# Patient Record
Sex: MALE | Race: OTHER | HISPANIC OR LATINO
[De-identification: names, ages, dates, MRNs, and addresses within clinical notes are randomized per-mention and may not be internally consistent; named-entity substitution may affect disease eponyms.]

---

## 2020-12-31 PROBLEM — Z00.00 ENCOUNTER FOR PREVENTIVE HEALTH EXAMINATION: Status: ACTIVE | Noted: 2020-12-31

## 2021-01-08 ENCOUNTER — APPOINTMENT (OUTPATIENT)
Dept: COLORECTAL SURGERY | Facility: CLINIC | Age: 56
End: 2021-01-08
Payer: MEDICAID

## 2021-01-08 VITALS
TEMPERATURE: 96.7 F | HEART RATE: 85 BPM | WEIGHT: 253 LBS | BODY MASS INDEX: 40.66 KG/M2 | HEIGHT: 66 IN | SYSTOLIC BLOOD PRESSURE: 154 MMHG | DIASTOLIC BLOOD PRESSURE: 90 MMHG

## 2021-01-08 DIAGNOSIS — E78.00 PURE HYPERCHOLESTEROLEMIA, UNSPECIFIED: ICD-10-CM

## 2021-01-08 DIAGNOSIS — Z83.3 FAMILY HISTORY OF DIABETES MELLITUS: ICD-10-CM

## 2021-01-08 DIAGNOSIS — I10 ESSENTIAL (PRIMARY) HYPERTENSION: ICD-10-CM

## 2021-01-08 DIAGNOSIS — E11.9 TYPE 2 DIABETES MELLITUS W/OUT COMPLICATIONS: ICD-10-CM

## 2021-01-08 DIAGNOSIS — Z87.828 PERSONAL HISTORY OF OTHER (HEALED) PHYSICAL INJURY AND TRAUMA: ICD-10-CM

## 2021-01-08 DIAGNOSIS — Z82.5 FAMILY HISTORY OF ASTHMA AND OTHER CHRONIC LOWER RESPIRATORY DISEASES: ICD-10-CM

## 2021-01-08 DIAGNOSIS — Z82.49 FAMILY HISTORY OF ISCHEMIC HEART DISEASE AND OTHER DISEASES OF THE CIRCULATORY SYSTEM: ICD-10-CM

## 2021-01-08 PROCEDURE — 99072 ADDL SUPL MATRL&STAF TM PHE: CPT

## 2021-01-08 PROCEDURE — 99204 OFFICE O/P NEW MOD 45 MIN: CPT | Mod: 25

## 2021-01-08 PROCEDURE — 45330 DIAGNOSTIC SIGMOIDOSCOPY: CPT

## 2021-01-08 NOTE — PHYSICAL EXAM
[No HSM] : no hepatosplenomegaly [Normal] : was normal [None] : there was no rectal mass  [Abdomen Masses] : No abdominal masses [Abdomen Tenderness] : ~T No ~M abdominal tenderness [FreeTextEntry1] : A rigid proctosigmoidoscope was passed through he anus into the rectum to 20  cm. . The mucosal surface were inspected. The patient tolerated the procedure well.\par \par The risks , benefits and alternatives of the procedure were reviewed with the patient. The patient consents to the planned procedure.\par \par The flexible sigmoidoscope was passed through the anus into the rectum. The scope was passed to  40   cm from the anal verge.\par \par The findings revealed:\par On retroflexion at 2 cm from the dentate line area of 1.5 x 1.5 CM post polypectomy ulceration. No residual polyp\par \par

## 2021-01-08 NOTE — ASSESSMENT
[FreeTextEntry1] : I reviewed with the patient and his daughter with a  the findings on examination workup will require further assessment for staging of his distal rectal cancer. We will obtain the slides for review. Pending review of the studies further treatment recommendations will be forthcoming. However, I would anticipate if there is no evidence of metastatic disease a transanal excision would be most appropriate of the polypectomy site. The risks, benefits and alternatives of the treatment plan have been outlined including recurrent cancer, metastatic disease, and need for future surgery.

## 2021-01-08 NOTE — HISTORY OF PRESENT ILLNESS
[FreeTextEntry1] : 54 y/o M presents for evaluation of newly diagnosed colon cancer\par C/o intermittent abdominal pain and bloating that improves with defecation. Has been experiencing this pain intermittently for the past 2 years\par Denies rectal bleeding, rectal pain, change in appetite or weight\par BH: daily to every other day\par Admits to constipation and straining\par Admits to limited dietary fiber intake. Drinking 6 +cups of water daily\par Denies FMH of GI disorder or GI cancers \par No ASA/NSAIDs last 7 days \par \par Colonoscopy completed 12/28/20\par - 7 mm polyp found in the proximal sigmoid colon, semi-pedunculated. Resected and retrieved\par - 3 mm polyp found in the rect-sigmoid colon, sessile. Resected and retrieved\par - 15 mm polyp in the distal rectum, resected and retrieved\par - non-bleeding internal hemorrhoids\par \par Pathology:\par A.) sigmoid: TA\par B.) Rectosigmoid: Hyperplastic polyp\par C.) Invasive, well-differentiated colorectal adenocarcinom arising in a TVA\par \par Previous colonoscopy completed 10/15/20\par - 7 mm polyp found in the sigmoid colon, pedunculated. Resected and retrieved \par - 10 mm polyp found in the descending colon, pedunculated. Resected and retrieved\par - internal hemorrhoids\par - large amount of stool in rest of colon interfering with visualization\par \par Pathology\par A & B: TA \par \par

## 2021-01-11 ENCOUNTER — NON-APPOINTMENT (OUTPATIENT)
Age: 56
End: 2021-01-11

## 2021-01-11 LAB
ALBUMIN SERPL ELPH-MCNC: 4.4 G/DL
ALP BLD-CCNC: 120 U/L
ALT SERPL-CCNC: 60 U/L
ANION GAP SERPL CALC-SCNC: 18 MMOL/L
AST SERPL-CCNC: 57 U/L
BASOPHILS # BLD AUTO: 0.04 K/UL
BASOPHILS NFR BLD AUTO: 0.6 %
BILIRUB SERPL-MCNC: 0.4 MG/DL
BUN SERPL-MCNC: 9 MG/DL
CALCIUM SERPL-MCNC: 9.5 MG/DL
CEA SERPL-MCNC: 2.3 NG/ML
CHLORIDE SERPL-SCNC: 102 MMOL/L
CO2 SERPL-SCNC: 18 MMOL/L
CREAT SERPL-MCNC: 0.64 MG/DL
EOSINOPHIL # BLD AUTO: 0.14 K/UL
EOSINOPHIL NFR BLD AUTO: 1.9 %
GLUCOSE SERPL-MCNC: 149 MG/DL
HCT VFR BLD CALC: 50.5 %
HGB BLD-MCNC: 16.8 G/DL
IMM GRANULOCYTES NFR BLD AUTO: 0.4 %
LYMPHOCYTES # BLD AUTO: 2.55 K/UL
LYMPHOCYTES NFR BLD AUTO: 35.5 %
MAN DIFF?: NORMAL
MCHC RBC-ENTMCNC: 30.1 PG
MCHC RBC-ENTMCNC: 33.3 GM/DL
MCV RBC AUTO: 90.5 FL
MONOCYTES # BLD AUTO: 0.6 K/UL
MONOCYTES NFR BLD AUTO: 8.3 %
NEUTROPHILS # BLD AUTO: 3.83 K/UL
NEUTROPHILS NFR BLD AUTO: 53.3 %
PLATELET # BLD AUTO: 167 K/UL
POTASSIUM SERPL-SCNC: 4.3 MMOL/L
PROT SERPL-MCNC: 7.7 G/DL
RBC # BLD: 5.58 M/UL
RBC # FLD: 12.8 %
SODIUM SERPL-SCNC: 138 MMOL/L
WBC # FLD AUTO: 7.19 K/UL

## 2021-01-12 ENCOUNTER — TRANSCRIPTION ENCOUNTER (OUTPATIENT)
Age: 56
End: 2021-01-12

## 2021-01-22 ENCOUNTER — RESULT REVIEW (OUTPATIENT)
Age: 56
End: 2021-01-22

## 2021-01-22 ENCOUNTER — APPOINTMENT (OUTPATIENT)
Dept: CT IMAGING | Facility: CLINIC | Age: 56
End: 2021-01-22
Payer: MEDICAID

## 2021-01-22 ENCOUNTER — APPOINTMENT (OUTPATIENT)
Dept: MRI IMAGING | Facility: CLINIC | Age: 56
End: 2021-01-22
Payer: MEDICAID

## 2021-01-22 ENCOUNTER — OUTPATIENT (OUTPATIENT)
Dept: OUTPATIENT SERVICES | Facility: HOSPITAL | Age: 56
LOS: 1 days | End: 2021-01-22

## 2021-01-22 PROCEDURE — 71260 CT THORAX DX C+: CPT | Mod: 26

## 2021-01-22 PROCEDURE — 72197 MRI PELVIS W/O & W/DYE: CPT | Mod: 26

## 2021-01-25 ENCOUNTER — RESULT REVIEW (OUTPATIENT)
Age: 56
End: 2021-01-25

## 2021-01-25 PROCEDURE — 74177 CT ABD & PELVIS W/CONTRAST: CPT | Mod: 26

## 2021-01-26 ENCOUNTER — NON-APPOINTMENT (OUTPATIENT)
Age: 56
End: 2021-01-26

## 2021-02-04 ENCOUNTER — RESULT REVIEW (OUTPATIENT)
Age: 56
End: 2021-02-04

## 2021-02-04 ENCOUNTER — OUTPATIENT (OUTPATIENT)
Dept: OUTPATIENT SERVICES | Facility: HOSPITAL | Age: 56
LOS: 1 days | End: 2021-02-04
Payer: MEDICAID

## 2021-02-04 ENCOUNTER — APPOINTMENT (OUTPATIENT)
Dept: COLORECTAL SURGERY | Facility: CLINIC | Age: 56
End: 2021-02-04
Payer: MEDICAID

## 2021-02-04 VITALS
DIASTOLIC BLOOD PRESSURE: 88 MMHG | BODY MASS INDEX: 40.34 KG/M2 | WEIGHT: 251 LBS | TEMPERATURE: 95.7 F | HEIGHT: 66 IN | SYSTOLIC BLOOD PRESSURE: 156 MMHG | HEART RATE: 83 BPM

## 2021-02-04 DIAGNOSIS — C20 MALIGNANT NEOPLASM OF RECTUM: ICD-10-CM

## 2021-02-04 PROCEDURE — 88321 CONSLTJ&REPRT SLD PREP ELSWR: CPT

## 2021-02-04 PROCEDURE — 99072 ADDL SUPL MATRL&STAF TM PHE: CPT

## 2021-02-04 PROCEDURE — 99215 OFFICE O/P EST HI 40 MIN: CPT

## 2021-02-04 RX ORDER — ATORVASTATIN CALCIUM 80 MG/1
80 TABLET, FILM COATED ORAL
Refills: 0 | Status: ACTIVE | COMMUNITY

## 2021-02-04 RX ORDER — GLIMEPIRIDE 4 MG/1
4 TABLET ORAL
Refills: 0 | Status: ACTIVE | COMMUNITY

## 2021-02-04 RX ORDER — AMLODIPINE BESYLATE 5 MG/1
5 TABLET ORAL
Refills: 0 | Status: ACTIVE | COMMUNITY

## 2021-02-04 NOTE — PHYSICAL EXAM
[Abdomen Masses] : No abdominal masses [Abdomen Tenderness] : ~T No ~M abdominal tenderness [No HSM] : no hepatosplenomegaly [Normal] : was normal [None] : there was no rectal mass

## 2021-02-04 NOTE — ASSESSMENT
[FreeTextEntry1] : I reviewed with the patient and his daughter/translating Lithuanian-the findings on examination and workup are consistent with a malignant rectal polyp. At this time there is no definitive evidence of residual disease endoscopically despite findings of abnormality noted on MRI. I suspect that the MRI may potentially be an over read secondary to the polypectomy tissue effects.\par I have had a thorough and detailed discussion with the patient regarding treatment options. The risks, benefits alternatives and treatments have been detailed. At this time his case will be presented at the [facility] [employer] by tumor conference. However, I would recommend that we proceed with a transanal excision of the polypectomy site to confirm residual cancer and stage of disease/T. stage. There is no MRI or radiographic evidence of jeronimo or metastatic disease. If transanal excision reveals T1 or no residual disease we will continue close surveillance observation. As long as there is no adverse risk pathological factors. However if residual cancer is noted to be T2 on final pathology after transanal excision, I would suspect he would be a candidate for potential abdominal perineal resection with permanent colostomy versus radiation chemotherapy and close surveillance. The patient is apprehensive to consider possibly of a permanent colostomy and wishes to proceed with local excision. I have outlined the role of continue close surveillance and followup.

## 2021-02-04 NOTE — HISTORY OF PRESENT ILLNESS
[FreeTextEntry1] : 56 y/o English and English speaking M presents for f/u T1-2 colon cancer\par \par Colonoscopy completed 12/28/20\par - 7 mm polyp found in the proximal sigmoid colon, semi-pedunculated. Resected and retrieved\par - 3 mm polyp found in the rect-sigmoid colon, sessile. Resected and retrieved\par - 15 mm polyp in the distal rectum, resected and retrieved\par - non-bleeding internal hemorrhoids\par \par Pathology:\par A.) sigmoid: TA\par B.) Rectosigmoid: Hyperplastic polyp\par C.) Invasive, well-differentiated colorectal adenocarcinoma arising in a TVA\par \par CT C/A/P completed 1/26/21\par - solitary nodule within the left lobe of the prostate gland, PSA correlation is recommended \par - negative for metastatic disease\par \par MRI pelvis completed 1/26/21 \par - 3.4 cm ulcerated mid rectal mass at 6.7 cm\par - T1-2, N0\par \par C/o intermittent pelvic pain with no aggravating of alleviating factors\par Denies rectal bleeding, rectal pain, change in appetite or weight\par BH: daily \par Admits to constipation and straining\par Admits to limited dietary fiber intake. Drinking 6 +cups of water daily\par Denies FMH of GI disorder or GI cancers \par No ASA/NSAIDs last 7 days

## 2021-02-08 ENCOUNTER — NON-APPOINTMENT (OUTPATIENT)
Age: 56
End: 2021-02-08

## 2021-02-08 LAB
PSA FREE FLD-MCNC: 24 %
PSA FREE SERPL-MCNC: 0.25 NG/ML
PSA SERPL-MCNC: 1.03 NG/ML
SURGICAL PATHOLOGY STUDY: SIGNIFICANT CHANGE UP

## 2021-02-10 ENCOUNTER — APPOINTMENT (OUTPATIENT)
Dept: UROLOGY | Facility: CLINIC | Age: 56
End: 2021-02-10
Payer: MEDICAID

## 2021-02-10 VITALS — HEART RATE: 81 BPM | SYSTOLIC BLOOD PRESSURE: 145 MMHG | DIASTOLIC BLOOD PRESSURE: 81 MMHG | TEMPERATURE: 97.8 F

## 2021-02-10 PROCEDURE — 99072 ADDL SUPL MATRL&STAF TM PHE: CPT

## 2021-02-10 PROCEDURE — 99204 OFFICE O/P NEW MOD 45 MIN: CPT

## 2021-02-10 NOTE — PHYSICAL EXAM
[General Appearance - Well Developed] : well developed [General Appearance - Well Nourished] : well nourished [Normal Appearance] : normal appearance [Well Groomed] : well groomed [General Appearance - In No Acute Distress] : no acute distress [Edema] : no peripheral edema [Respiration, Rhythm And Depth] : normal respiratory rhythm and effort [Abdomen Soft] : soft [Exaggerated Use Of Accessory Muscles For Inspiration] : no accessory muscle use [Abdomen Tenderness] : non-tender [Costovertebral Angle Tenderness] : no ~M costovertebral angle tenderness [Penis Abnormality] : normal uncircumcised penis [Urethral Meatus] : meatus normal [Urinary Bladder Findings] : the bladder was normal on palpation [Scrotum] : the scrotum was normal [Rectal Exam - Seminal Vesicles] : the seminal vesicles were normal [Testes Tenderness] : no tenderness of the testes [Testes Mass (___cm)] : there were no testicular masses [Normal Station and Gait] : the gait and station were normal for the patient's age [] : no rash [No Focal Deficits] : no focal deficits [Affect] : the affect was normal [Oriented To Time, Place, And Person] : oriented to person, place, and time [Mood] : the mood was normal [Not Anxious] : not anxious [No Palpable Adenopathy] : no palpable adenopathy [Prostate Tenderness] : the prostate was not tender [FreeTextEntry1] : Difficult CINTHYA due to body habitisus but slight irregularity noted on left side of prostate

## 2021-02-10 NOTE — HISTORY OF PRESENT ILLNESS
[Nocturia] : nocturia [None] : None [FreeTextEntry1] :   ID # [id number]\par This is a 55 year old male who presents today as referral from Dr. Rowland after CT finding of solitary nodule within left lobe of the prostate gland.  He is in the process of being treated for rectal adenocarcinoma. \par Recent PSA 1.03 done on 2/4/21\par \par He reports Nocturia x 4-5 for the past 6 months, which he state prior was 0\par No hematuria, no dysuria\par Occasional lower back pain, can be severe at time, resolves without intervention.\par \par He is currently scheduled to undergo transanal excision of the polypectomy on 2/25/21\par \par Social Hx: nonsmoker, rare ETOH use, works in a restaurant\par Family Hx: no CaP, unusure of any other cancer within family\par \par  [Urinary Incontinence] : no urinary incontinence [Urinary Retention] : no urinary retention [Urinary Frequency] : no urinary frequency [Urinary Urgency] : no urinary urgency [Straining] : no straining [Weak Stream] : no weak stream [Intermittency] : no intermittency [Post-Void Dribbling] : no post-void dribbling [Dysuria] : no dysuria [Hematuria - Gross] : no gross hematuria [Flank Pain] : no flank pain [Weight Loss] : no recent ~M [unfilled] weight loss [Fever] : no fever [Fatigue] : no fatigue [Nausea] : no nausea [Anorexia] : no anorexia

## 2021-02-10 NOTE — ASSESSMENT
[FreeTextEntry1] : 55 year old Kiswahili male with nodule on prostate found after work up for colon cancer\par -abnormal CINTHYA but difficult to fully assess due to body habitus\par -reviewed findings of CT with patient \par -PVR today was 25 cc\par -will start Tamsulosin for nocturia, goals along with side effect discussed with patient\par -MRI prostate to further evaluate nodule seen on CT  \par \par RTC after MRI

## 2021-02-10 NOTE — END OF VISIT
[FreeTextEntry3] : 56yo male with rectal cancer, found to have prostate nodule on CT imaging. PSA is normal. MRI 1.5T of the pelvis is non-diagnostic for prostate cancer. Reviewed imaging and reports. Recommend dedicated MRI 3 Kailey of the prostate. Will try to obtain urgently before planned resection of rectal cancer.

## 2021-02-22 ENCOUNTER — EMERGENCY (EMERGENCY)
Facility: HOSPITAL | Age: 56
LOS: 1 days | Discharge: HOME | End: 2021-02-22
Attending: EMERGENCY MEDICINE | Admitting: EMERGENCY MEDICINE
Payer: MEDICAID

## 2021-02-22 VITALS
HEART RATE: 89 BPM | OXYGEN SATURATION: 97 % | RESPIRATION RATE: 18 BRPM | SYSTOLIC BLOOD PRESSURE: 160 MMHG | DIASTOLIC BLOOD PRESSURE: 88 MMHG | TEMPERATURE: 98 F | WEIGHT: 253.09 LBS

## 2021-02-22 DIAGNOSIS — M79.644 PAIN IN RIGHT FINGER(S): ICD-10-CM

## 2021-02-22 DIAGNOSIS — Z20.822 CONTACT WITH AND (SUSPECTED) EXPOSURE TO COVID-19: ICD-10-CM

## 2021-02-22 LAB — SARS-COV-2 RNA SPEC QL NAA+PROBE: SIGNIFICANT CHANGE UP

## 2021-02-22 PROCEDURE — 99283 EMERGENCY DEPT VISIT LOW MDM: CPT

## 2021-02-22 PROCEDURE — U0003: CPT

## 2021-02-22 PROCEDURE — 73130 X-RAY EXAM OF HAND: CPT

## 2021-02-22 PROCEDURE — 73130 X-RAY EXAM OF HAND: CPT | Mod: 26,RT

## 2021-02-22 PROCEDURE — U0005: CPT

## 2021-02-22 NOTE — ED PROVIDER NOTE - NSFOLLOWUPINSTRUCTIONS_ED_ALL_ED_FT
FINGER SPRAIN - AfterCare(R) Instructions(ER/ED)           Finger Sprain    WHAT YOU NEED TO KNOW:    A finger sprain happens when ligaments in your finger or thumb are stretched or torn. Ligaments are the tough tissues that connect bones. Ligaments allow your hands to grasp and pinch.    DISCHARGE INSTRUCTIONS:    Return to the emergency department if:   •The skin on your injured finger looks bluish or pale (less color than normal).      •You have new weakness or numbness in your finger or thumb. It may tingle or burn.       •You have a splint that you cannot adjust and it feels too tight.      Contact your healthcare provider if:   •You have new or increased swelling or pain in your finger.      •You have new or increased stiffness when you move your injured finger.      •You have questions or concerns about your injury or treatment.      Medicines:   •Pain medicine may be given. Do not wait until the pain is severe before taking your medicine.      •Take your medicine as directed. Call your healthcare provider if you think your medicines are not helping or if you have side effects. Tell him if you take vitamins, herbs, or any other medicines. Keep a written list of your medicines. Include the amounts, and when and why you take them. Bring the list or the pill bottles to follow-up visits.       Care for your finger:   •Rest your finger for at least 48 hours. Do not do activities that cause pain. Return to normal activities as directed.      •Apply ice on your finger to help decrease pain and swelling. Put crushed ice in a plastic bag and cover it with a towel. Put the ice on your injured finger or thumb every hour for 15 to 20 minutes at a time. You may need to ice the area at least 4 to 8 times each day. Ice your finger for as many days as directed.      •Elevate your finger above the level of your heart as often as you can. This will help decrease swelling and pain. You can elevate your hand by resting it on a pillow.       •Use a splint or compression as directed. Compression (tight hold) helps support your finger or thumb as it heals. Tape your injured finger to the finger beside it. Severe sprains may be treated with a splint. A splint prevents your finger from moving while it heals. Ask how long you must wear the splint or tape, and how to apply them.       •Do exercises as directed. You may be given gentle exercises to begin in a few days. Exercises can help decrease stiffness in your finger or thumb. Exercises also help decrease pain and swelling and improve the movement of your finger or thumb. Check with your healthcare provider before you return to your normal activities or sports.       Follow up with your healthcare provider as directed: Write down any questions you may have to ask at your follow up visits.        © Copyright [employer] 2021           back to top                          © Copyright [employer] 2021

## 2021-02-22 NOTE — ED PROVIDER NOTE - PATIENT PORTAL LINK FT
You can access the FollowMyHealth Patient Portal offered by [facility] by registering at the following website: [email or web address]. By joining [employer]’s FollowMyHealth portal, you will also be able to view your health information using other applications (apps) compatible with our system.

## 2021-02-22 NOTE — ED ADULT TRIAGE NOTE - CHIEF COMPLAINT QUOTE
c/o right hand pain.  Patient slipped and fell down 3 steps last week and landed on his right hand.  Denies loc, use of thinners, neck / back pain, head injury

## 2021-02-22 NOTE — ED PROVIDER NOTE - OBJECTIVE STATEMENT
55 M co R 5th finger pain- s/p fall about 7-8 days ago  sharp R finger pain- weakness w finger movements  no other injuries  px is borderline BM  no other sig pmh

## 2021-02-22 NOTE — ED PROVIDER NOTE - CLINICAL SUMMARY MEDICAL DECISION MAKING FREE TEXT BOX
Finger weakness/pain s/p fall- no obvious fx on xray- d/c home in splint- fu w hand surgeon urgently

## 2021-02-22 NOTE — ED ADULT NURSE NOTE - OBJECTIVE STATEMENT
Patient A&Ox4, respirations even and unlabored, right palm abrasion observed, swelling to right 5th digits, otherwise no deformities, patient able to move hand. Patient reports mechanical fall x 2 week ago Denies head injuries.

## 2021-02-24 ENCOUNTER — TRANSCRIPTION ENCOUNTER (OUTPATIENT)
Age: 56
End: 2021-02-24

## 2021-02-24 VITALS
TEMPERATURE: 99 F | HEIGHT: 66 IN | WEIGHT: 250 LBS | DIASTOLIC BLOOD PRESSURE: 79 MMHG | HEART RATE: 87 BPM | SYSTOLIC BLOOD PRESSURE: 131 MMHG | RESPIRATION RATE: 16 BRPM | OXYGEN SATURATION: 95 %

## 2021-02-24 NOTE — ASU PREOP CHECKLIST - SELECT TESTS ORDERED
Covid neg 2/22/21/BMP/CBC/CMP/PT/PTT/INR/EKG/COVID Covid neg 2/22/21/BMP/CBC/CMP/PT/PTT/INR/Type and Screen/EKG/COVID

## 2021-02-24 NOTE — ASU PREOP CHECKLIST - DENTURES
[facility]    Progress Note    Pavan Luciano Patient Status:  Inpatient    1963 MRN [id number]   [facility] 4SW-A Attending Loc Salazar MD   [facility] Day # 15 PCP Johnathon Mina MD     Subjective:  Pavan Luciano is a(n) 41 mg in dextrose 5 % 100 mL IVPB 250 mg Intravenous Q12H   Midazolam HCl (VERSED) 2 MG/2ML injection 2 mg 2 mg Intravenous Q5 Min PRN   Normal Saline Flush 0.9 % injection 10 mL 10 mL Intravenous PRN   peppermint oil liquid 10 mL 10 mL Other PRN   hydrochlor WBC 18.0 05/03/2018   HGB 9.1 05/03/2018   HCT 30.0 05/03/2018   .0 05/03/2018   CREATSERUM 2.03 05/03/2018   CREATSERUM 2.03 05/03/2018   BUN 52 05/03/2018    05/03/2018   K 3.5 05/03/2018    05/03/2018   CO2 21.0 05/03/2018    fever, afebrile for 24 hours  CSF evaluation for infection has been brought up, however he has continued to become more alert without any treatment for viral or atypical meningitis/encephalitis, which argues against having these, will continue to monitor f no

## 2021-02-24 NOTE — ASU PATIENT PROFILE, ADULT - NS PRO ABUSE SCREEN AFRAID ANYONE YN
History of Present Illness





- Reason for Consult


Consult date: 11/24/19


Neck wound SIRS and UTI


Requesting physician: Kylie Ovalle





- Chief Complaint


Fever and mental status changes x one day





- History of Present Illness


Patient is a 68-year-old  male with a past medical history significant 

for Ms. his carcinoma to the left side of the neck reCurrent with secondary 

metastasis to the lung and the local lymph node, for the patient has recently 

completed a series of palliative radiation therapy to the neck Site, patient was

brought into the ER at [facility] on 11/22/2019 by the wife who has 

been concerned the patient was confused and not making any sense , the patient 

did not recall remembering anything on the day of presentation the hospital or 

denies having any headache currently the patient denies having any worsening 

pain to the neck with the patient currently did have 2 wounds he did have mild 

aching pain intensity about 3-4 out of 10 and no radiation however the wife 

mentioned more redness to the neck site the patient denies having any chest pain

or shortness breath very minimal cough but no sputum production he did have an 

episode of vomiting but no further vomiting has been noticing denies any bowel. 

No diarrhea no significant burning frequency or difficulty urination with the 

symptoms and the patient was initially evaluated by the physician on route via 

the patient did have fever of 102 revealed Fahrenheit patient did have mild 

tachycardia and elevated white count patient also have a mildly positive UA 

glucose wound culture has been obtained is currently pending patient has been 

treated with vancomycin and cefepime infectious disease was consulted for 

further recommendation regarding antibiotic therapy








Review of Systems


Positive point has been  mentioned in the HPI rest of the systems are negative








Past Medical History


Past Medical History: Cancer, Hypertension


Additional Past Medical History / Comment(s): HX SKIN CA, CURRENT LESION ON LEFT

CHEEK, lung CA, CA in the lymph nodes. 10 treatments of radiation over 3 week 

period, last treatment 11/20/19.


History of Any Multi-Drug Resistant Organisms: None Reported


Past Surgical History: Back Surgery, Heart Catheterization, Joint Replacement, 

Orthopedic Surgery


Additional Past Surgical History / Comment(s): PREVIOUS SKIN CA LESIONS REMOVED,

R HIP REPLACEMENT, ANKLE ORIF WITH METAL L, lung biopsy, neck biopsy


Past Anesthesia/Blood Transfusion Reactions: Previous Problems w/ Anesthesia


Additional Past Anesthesia/Blood Transfusion Reaction / Comm: Pt woke up 

violently, which is out of characteristic for the pt


Past Psychological History: No Psychological Hx Reported


Smoking Status: Never smoker


Past Alcohol Use History: Occasional


Additional Past Alcohol Use History / Comment(s): Patient is a lifelong nonsmoke

r but had extensive exposure to asbestos and acid at [employer] as well as 

plastic exposure and [employer].


Past Drug Use History: None Reported





- Past Family History


  ** Mother


Family Medical History: Cancer, Deep Vein Thrombosis (DVT)


Additional Family Medical History / Comment(s): BREAST CA





Medications and Allergies


                                Home Medications











 Medication  Instructions  Recorded  Confirmed  Type


 


Acetaminophen [Tylenol Arthritis] 650 mg PO BID PRN 10/31/19 11/22/19 History


 


Melatonin 10 mg PO HS #30 tablet.er 11/03/19 11/22/19 Rx


 


hydrALAZINE HCL [Apresoline] 25 mg PO TID #90 tab 11/03/19 11/22/19 Rx


 


Metoprolol Tartrate [Lopressor] 12.5 mg PO HS 11/22/19 11/22/19 History








                                    Allergies











Allergy/AdvReac Type Severity Reaction Status Date / Time


 


No Known Allergies Allergy   Verified 11/22/19 16:10














Physical Exam


Vitals: 


                                   Vital Signs











  Temp Pulse Resp BP Pulse Ox


 


 11/24/19 11:07  100 F H  93  18  174/93  97


 


 11/24/19 08:00  99.4 F  97  18  170/93  97


 


 11/24/19 03:00  99.1 F  85  16  161/90  97


 


 11/24/19 00:10  99.5 F  88  16  142/76  95


 


 11/23/19 20:45  99.6 F  92  18  146/84  97


 


 11/23/19 19:20      96


 


 11/23/19 16:00    18  


 


 11/23/19 15:17  100.1 F H  104 H  18  140/81  98








                                Intake and Output











 11/23/19 11/24/19 11/24/19





 22:59 06:59 14:59


 


Intake Total   480


 


Output Total 450 900 580


 


Balance -450 -900 -100


 


Intake:   


 


  Oral   480


 


Output:   


 


  Urine 450 900 580


 


Other:   


 


  Voiding Method Urinal Urinal Urinal


 


  # Voids 1 1 


 


  Weight  80.8 kg 











GENERAL DESCRIPTION: An elderly male lying in bed, no distress. No tachypnea or 

accessory muscle of respiration use.


HEENT: Shows Pallor , no scleral icterus. Oral mucous membrane is dry. No 

pharyngeal erythema or thrush


NECK: Trachea central, no thyromegaly.  Left side of the neck tissues 2 wounds 

posteriorly wound did have some slough tissue present area of induration and 

redness which is warm to touch no foul-smelling drainage


LUNGS: Unlabored breathing. Clear to auscultation anteriorly. No wheeze or 

crackle.


HEART: S1, S2, regular rate and rhythm. No loud murmur


ABDOMEN: Soft, no tenderness , guarding or rigidity, no organomegaly


EXTREMITIES: No edema of feet.


SKIN: No rash, no masses palpable.


NEUROLOGICAL: The patient is awake, alert, oriented x3, mood and affect normal.

















Results


CBC & Chem 7: 


                                 11/24/19 06:55





                                 11/24/19 06:55


Labs: 


                  Abnormal Lab Results - Last 24 Hours (Table)











  11/24/19 11/24/19 Range/Units





  06:55 06:55 


 


RBC   4.07 L  (4.30-5.90)  m/uL


 


Hgb   9.4 L  (13.0-17.5)  gm/dL


 


Hct   30.4 L  (39.0-53.0)  %


 


MCV   74.7 L  (80.0-100.0)  fL


 


MCH   23.2 L  (25.0-35.0)  pg


 


RDW   16.6 H  (11.5-15.5)  %


 


Lymphocytes #   0.3 L  (1.0-4.8)  k/uL


 


Sodium  135 L   (137-145)  mmol/L


 


Potassium  3.1 L   (3.5-5.1)  mmol/L


 


BUN  8 L   (9-20)  mg/dL


 


Creatinine  0.62 L   (0.66-1.25)  mg/dL


 


Glucose  103 H   (74-99)  mg/dL


 


Calcium  8.0 L   (8.4-10.2)  mg/dL








                      Microbiology - Last 24 Hours (Table)











 11/23/19 17:00 Gram Stain - Preliminary





 Neck Wound Culture - Preliminary


 


 11/23/19 17:00 Anaerobic Culture - Preliminary





 Neck 


 


 11/22/19 16:10 Blood Culture - Preliminary





 Blood    No Growth after 24 hours


 


 11/22/19 15:10 Urine Culture - Final





 Urine,Voided 














Assessment and Plan


Assessment: 





1-patient presented to hospital with sepsis in this patient who did a fever and 

elevated white count in this patient currently undergoing radiation therapy to 

the left side of the neck basal cell carcinoma currently with 2 wounds patient 

noticed to have more redness to the neck with underlying induration consented 

likely for cellulitis and will need to make sure no evidence of underlying 

abscess


2-patient did have a positive UA however patient did not mention significant 

urinary symptoms to be concerned for symptomatic urinary tract infection


(1) Sepsis


Current Visit: Yes   Status: Acute   Code(s): A41.9 - SEPSIS, UNSPECIFIED 

ORGANISM   SNOMED Code(s): 53024646


   


Plan: 





1-we will obtain a CT of the neck to make sure that evidence of any underlying 

abscess that may need to be drained


2-Vancomycin pharmacy to dose target trough of 15 while watching his kidney 

function and Vanco trough closely


3-cefepime 2 g every 12 hours


4-local wound care with jerrelloney followed by the moist dressing to be changed 

daily


We will follow on clinical condition and cultures to further adjust medication 

if needed


Thank you for this consultation will follow this patient with you





Time with Patient: Greater than 30
no

## 2021-02-25 ENCOUNTER — OUTPATIENT (OUTPATIENT)
Dept: INPATIENT UNIT | Facility: HOSPITAL | Age: 56
LOS: 1 days | Discharge: HOME | End: 2021-02-25
Payer: MEDICAID

## 2021-02-25 ENCOUNTER — APPOINTMENT (OUTPATIENT)
Dept: COLORECTAL SURGERY | Facility: HOSPITAL | Age: 56
End: 2021-02-25

## 2021-02-25 VITALS
TEMPERATURE: 97 F | HEART RATE: 86 BPM | RESPIRATION RATE: 16 BRPM | DIASTOLIC BLOOD PRESSURE: 78 MMHG | SYSTOLIC BLOOD PRESSURE: 128 MMHG | OXYGEN SATURATION: 96 %

## 2021-02-25 DIAGNOSIS — Z98.890 OTHER SPECIFIED POSTPROCEDURAL STATES: Chronic | ICD-10-CM

## 2021-02-25 LAB
BLD GP AB SCN SERPL QL: NEGATIVE — SIGNIFICANT CHANGE UP
RH IG SCN BLD-IMP: POSITIVE — SIGNIFICANT CHANGE UP

## 2021-02-25 PROCEDURE — 45172 EXC RECT TUM TRANSANAL FULL: CPT

## 2021-02-25 PROCEDURE — 86850 RBC ANTIBODY SCREEN: CPT

## 2021-02-25 PROCEDURE — 45330 DIAGNOSTIC SIGMOIDOSCOPY: CPT | Mod: GC

## 2021-02-25 PROCEDURE — 86901 BLOOD TYPING SEROLOGIC RH(D): CPT

## 2021-02-25 PROCEDURE — 88305 TISSUE EXAM BY PATHOLOGIST: CPT | Mod: 26

## 2021-02-25 PROCEDURE — 45172 EXC RECT TUM TRANSANAL FULL: CPT | Mod: GC

## 2021-02-25 PROCEDURE — 82962 GLUCOSE BLOOD TEST: CPT

## 2021-02-25 PROCEDURE — 88305 TISSUE EXAM BY PATHOLOGIST: CPT

## 2021-02-25 PROCEDURE — 86900 BLOOD TYPING SEROLOGIC ABO: CPT

## 2021-02-25 RX ORDER — HYDROMORPHONE HYDROCHLORIDE 2 MG/ML
0.5 INJECTION INTRAMUSCULAR; INTRAVENOUS; SUBCUTANEOUS ONCE
Refills: 0 | Status: DISCONTINUED | OUTPATIENT
Start: 2021-02-25 | End: 2021-02-25

## 2021-02-25 RX ORDER — ACETAMINOPHEN 500 MG
1000 TABLET ORAL ONCE
Refills: 0 | Status: COMPLETED | OUTPATIENT
Start: 2021-02-25 | End: 2021-02-25

## 2021-02-25 RX ORDER — ACETAMINOPHEN 500 MG
650 TABLET ORAL ONCE
Refills: 0 | Status: COMPLETED | OUTPATIENT
Start: 2021-02-25 | End: 2021-02-25

## 2021-02-25 RX ORDER — SODIUM CHLORIDE 9 MG/ML
1000 INJECTION, SOLUTION INTRAVENOUS
Refills: 0 | Status: DISCONTINUED | OUTPATIENT
Start: 2021-02-25 | End: 2021-02-25

## 2021-02-25 RX ORDER — HEPARIN SODIUM 5000 [USP'U]/ML
5000 INJECTION INTRAVENOUS; SUBCUTANEOUS ONCE
Refills: 0 | Status: DISCONTINUED | OUTPATIENT
Start: 2021-02-25 | End: 2021-02-25

## 2021-02-25 RX ORDER — GABAPENTIN 400 MG/1
600 CAPSULE ORAL ONCE
Refills: 0 | Status: COMPLETED | OUTPATIENT
Start: 2021-02-25 | End: 2021-02-25

## 2021-02-25 RX ORDER — HEPARIN SODIUM 5000 [USP'U]/ML
5000 INJECTION INTRAVENOUS; SUBCUTANEOUS ONCE
Refills: 0 | Status: COMPLETED | OUTPATIENT
Start: 2021-02-25 | End: 2021-02-25

## 2021-02-25 RX ORDER — DOCUSATE SODIUM 100 MG
2 CAPSULE ORAL
Qty: 30 | Refills: 0
Start: 2021-02-25

## 2021-02-25 RX ADMIN — Medication 1000 MILLIGRAM(S): at 08:58

## 2021-02-25 RX ADMIN — HYDROMORPHONE HYDROCHLORIDE 0.5 MILLIGRAM(S): 2 INJECTION INTRAMUSCULAR; INTRAVENOUS; SUBCUTANEOUS at 11:42

## 2021-02-25 RX ADMIN — HEPARIN SODIUM 5000 UNIT(S): 5000 INJECTION INTRAVENOUS; SUBCUTANEOUS at 08:57

## 2021-02-25 RX ADMIN — GABAPENTIN 600 MILLIGRAM(S): 400 CAPSULE ORAL at 08:58

## 2021-02-25 NOTE — BRIEF OPERATIVE NOTE - OPERATION/FINDINGS
Flexible sigmoidoscopy performed to identify area of ulceration and healing from prior polypectomy site, in left posteriolateral location. Excised full thickness, and defect closed with vicryl suture.

## 2021-02-26 ENCOUNTER — NON-APPOINTMENT (OUTPATIENT)
Age: 56
End: 2021-02-26

## 2021-02-26 PROBLEM — E11.9 TYPE 2 DIABETES MELLITUS WITHOUT COMPLICATIONS: Chronic | Status: ACTIVE | Noted: 2021-02-24

## 2021-02-26 PROBLEM — I10 ESSENTIAL (PRIMARY) HYPERTENSION: Chronic | Status: ACTIVE | Noted: 2021-02-24

## 2021-02-26 PROBLEM — E78.5 HYPERLIPIDEMIA, UNSPECIFIED: Chronic | Status: ACTIVE | Noted: 2021-02-24

## 2021-03-01 RX ORDER — OXYCODONE 5 MG/1
5 TABLET ORAL EVERY 6 HOURS
Qty: 12 | Refills: 0 | Status: DISCONTINUED | COMMUNITY
Start: 2021-03-01 | End: 2021-03-01

## 2021-03-02 ENCOUNTER — NON-APPOINTMENT (OUTPATIENT)
Age: 56
End: 2021-03-02

## 2021-03-15 ENCOUNTER — APPOINTMENT (OUTPATIENT)
Dept: COLORECTAL SURGERY | Facility: CLINIC | Age: 56
End: 2021-03-15
Payer: MEDICAID

## 2021-03-15 VITALS
TEMPERATURE: 97.6 F | WEIGHT: 248 LBS | SYSTOLIC BLOOD PRESSURE: 137 MMHG | HEART RATE: 88 BPM | BODY MASS INDEX: 39.86 KG/M2 | DIASTOLIC BLOOD PRESSURE: 80 MMHG | HEIGHT: 66 IN

## 2021-03-15 PROCEDURE — 99024 POSTOP FOLLOW-UP VISIT: CPT

## 2021-03-15 NOTE — PHYSICAL EXAM
[Abdomen Masses] : No abdominal masses [Abdomen Tenderness] : ~T No ~M abdominal tenderness [No HSM] : no hepatosplenomegaly [Patient Refused] : exam was refused by the patient [Normal] : was normal [No Rash or Lesion] : No rash or lesion [Alert] : alert [Calm] : calm

## 2021-03-15 NOTE — ASSESSMENT
[FreeTextEntry1] : I reviewed with the patient and he has a clinical stage I rectal cancer status post polypectomy and status post transanal excision with no residual cancer identified.\par \par Recommend surveillance  with repeat sigmoidoscopy and labs in  4 months.\par \par

## 2021-03-15 NOTE — HISTORY OF PRESENT ILLNESS
[FreeTextEntry1] : 54 y/o Georgian and English speaking M presents for f/u T1-2 Colon cancer\par \par S/p TAMIS rectal cancer (full thickness) 2/25/21\par \par - Rectum at 2 cm:\par   - no tumor seen\par   - focus of submucosal fibrosis \par \par \par C/o occasional rectal pain with prolonged sitting, using Tylenol PRN\par Denies rectal bleeding or itching\par BH: BID\par Denies constipation, diarrhea or straining\par Taking Colace 2 tabs 2-3 times daily\par Reports adequate dietary fiber and water intake\par Initially did sitz baths after surgery, no longer doing \par

## 2021-04-23 ENCOUNTER — OUTPATIENT (OUTPATIENT)
Dept: OUTPATIENT SERVICES | Facility: HOSPITAL | Age: 56
LOS: 1 days | End: 2021-04-23
Payer: MEDICAID

## 2021-04-23 ENCOUNTER — APPOINTMENT (OUTPATIENT)
Dept: MRI IMAGING | Facility: HOSPITAL | Age: 56
End: 2021-04-23

## 2021-04-23 ENCOUNTER — RESULT REVIEW (OUTPATIENT)
Age: 56
End: 2021-04-23

## 2021-04-23 DIAGNOSIS — Z98.890 OTHER SPECIFIED POSTPROCEDURAL STATES: Chronic | ICD-10-CM

## 2021-04-23 PROCEDURE — 72197 MRI PELVIS W/O & W/DYE: CPT

## 2021-04-23 PROCEDURE — A9585: CPT

## 2021-04-23 PROCEDURE — 72197 MRI PELVIS W/O & W/DYE: CPT | Mod: 26

## 2021-04-27 ENCOUNTER — NON-APPOINTMENT (OUTPATIENT)
Age: 56
End: 2021-04-27

## 2021-05-27 ENCOUNTER — EMERGENCY (EMERGENCY)
Facility: HOSPITAL | Age: 56
LOS: 1 days | Discharge: HOME | End: 2021-05-27
Admitting: EMERGENCY MEDICINE
Payer: MEDICAID

## 2021-05-27 VITALS
HEIGHT: 66 IN | TEMPERATURE: 98 F | WEIGHT: 244.93 LBS | SYSTOLIC BLOOD PRESSURE: 152 MMHG | DIASTOLIC BLOOD PRESSURE: 88 MMHG | RESPIRATION RATE: 18 BRPM | HEART RATE: 96 BPM | OXYGEN SATURATION: 96 %

## 2021-05-27 DIAGNOSIS — L50.9 URTICARIA, UNSPECIFIED: ICD-10-CM

## 2021-05-27 DIAGNOSIS — Z98.890 OTHER SPECIFIED POSTPROCEDURAL STATES: Chronic | ICD-10-CM

## 2021-05-27 DIAGNOSIS — R21 RASH AND OTHER NONSPECIFIC SKIN ERUPTION: ICD-10-CM

## 2021-05-27 PROCEDURE — 99283 EMERGENCY DEPT VISIT LOW MDM: CPT

## 2021-05-27 PROCEDURE — 99282 EMERGENCY DEPT VISIT SF MDM: CPT

## 2021-05-27 RX ORDER — DIPHENHYDRAMINE HCL 50 MG
1 CAPSULE ORAL
Qty: 15 | Refills: 0
Start: 2021-05-27 | End: 2021-05-31

## 2021-05-27 NOTE — ED PROVIDER NOTE - SKIN, MLM
Skin normal color for race, warm, dry and intact. mild urticarial rash to anterior chest wall and upper arms,

## 2021-05-27 NOTE — ED PROVIDER NOTE - MUSCULOSKELETAL, MLM
Spine and all extremities  appears normal, range of motion is not limited, no muscle or joint tenderness

## 2021-05-27 NOTE — ED PROVIDER NOTE - NSFOLLOWUPINSTRUCTIONS_ED_ALL_ED_FT
URTICARIA - AfterCare(R) Instructions(ER/ED)           Urticaria    WHAT YOU NEED TO KNOW:    Urticaria is also called hives. Hives can change size and shape, and appear anywhere on your skin. They can be mild or severe and last from a few minutes to a few days. Hives may be a sign of a severe allergic reaction called anaphylaxis that needs immediate treatment. Urticaria that lasts longer than 6 weeks may be a chronic condition that needs long-term treatment.     DISCHARGE INSTRUCTIONS:    Call 911 for signs or symptoms of anaphylaxis, such as trouble breathing, swelling in your mouth or throat, or wheezing. You may also have itching, a rash, or feel like you are going to faint.    Return to the emergency department if:   •Your heart is beating faster than it normally does.      •You have cramping or severe pain in your abdomen.      Contact your healthcare provider if:   •You have a fever.      •Your skin still itches 24 hours after you take your medicine.      •You still have hives after 7 days.      •Your joints are painful and swollen.      •You have questions or concerns about your condition or care.      Medicines:   •Epinephrine is used to treat severe allergic reactions such as anaphylaxis.      •Antihistamines decrease mild symptoms such as itching or a rash.      •Steroids decrease redness, pain, and swelling.      •Take your medicine as directed. Contact your healthcare provider if you think your medicine is not helping or if you have side effects. Tell him of her if you are allergic to any medicine. Keep a list of the medicines, vitamins, and herbs you take. Include the amounts, and when and why you take them. Bring the list or the pill bottles to follow-up visits. Carry your medicine list with you in case of an emergency.      Steps to take for signs or symptoms of anaphylaxis:   •Immediately give 1 shot of epinephrine only into the outer thigh muscle.       •Leave the shot in place as directed. Your healthcare provider may recommend you leave it in place for up to 10 seconds before you remove it. This helps make sure all of the epinephrine is delivered.       •Call 911 and go to the emergency department, even if the shot improved symptoms. Do not drive yourself. Bring the used epinephrine shot with you.       Safety precautions to take if you are at risk for anaphylaxis:   •Keep 2 shots of epinephrine with you at all times. You may need a second shot, because epinephrine only works for about 20 minutes and symptoms may return. Your healthcare provider can show you and family members how to give the shot. Check the expiration date every month and replace it before it expires.      •Create an action plan. Your healthcare provider can help you create a written plan that explains the allergy and an emergency plan to treat a reaction. The plan explains when to give a second epinephrine shot if symptoms return or do not improve after the first. Give copies of the action plan and emergency instructions to family members, work and school staff, and  providers. Show them how to give a shot of epinephrine.      •Be careful when you exercise. If you have had exercise-induced anaphylaxis, do not exercise right after you eat. Stop exercising right away if you start to develop any signs or symptoms of anaphylaxis. You may first feel tired, warm, or have itchy skin. Hives, swelling, and severe breathing problems may develop if you continue to exercise.      •Carry medical alert identification. Wear medical alert jewelry or carry a card that explains the allergy. Ask your healthcare provider where to get these items.   Medical Alert Jewelry           •Keep a record of triggers and symptoms. Record everything you eat, drink, or apply to your skin for 3 weeks. Include stressful events and what you were doing right before your hives started. Bring the record with you to follow-up visits with your healthcare provider.      Manage urticaria:   •Cool your skin. This may help decrease itching. Apply a cool pack to your hives. Dip a hand towel in cool water, wring it out, and place it on your hives. You may also soak your skin in a cool oatmeal bath.      •Do not rub your hives. This can irritate your skin and cause more hives.      •Wear loose clothing. Tight clothes may irritate your skin and cause more hives.      •Manage stress. Stress may trigger hives, or make them worse. Learn new ways to relax, such as deep breathing.       Follow up with your healthcare provider as directed: Write down your questions so you remember to ask them during your visits.

## 2021-05-27 NOTE — ED PROVIDER NOTE - PATIENT PORTAL LINK FT
You can access the FollowMyHealth Patient Portal offered by [facility] by registering at the following website: [email or web address]. By joining [facility]’s FollowMyHealth portal, you will also be able to view your health information using other applications (apps) compatible with our system.

## 2021-05-27 NOTE — ED PROVIDER NOTE - OBJECTIVE STATEMENT
54 yo male in the ER c/o rash  that he noted on his back, chest also on his legs. Rash Is raised, itchy, pt reports rash noted after he was walking on the street.  Pt denies fever, chills, cough, diarrhea, SOB, difficulty breathing, throat or tongue edema.  Pt took Benadryl and reports his rash subsided,

## 2021-05-27 NOTE — ED ADULT TRIAGE NOTE - CHIEF COMPLAINT QUOTE
Pt presents reporting 4 days of itchy rash to trunk and legs. Reports temporary relief from benadryl 2 days ago. No recent change to medications. Hx of colon CA diagnosed 12/20, operated on 2/21 no chemo no radiation.

## 2021-05-27 NOTE — ED PROVIDER NOTE - CLINICAL SUMMARY MEDICAL DECISION MAKING FREE TEXT BOX
56 yo male in the ER c/o pruritic rash to his trunk and arms, upper legs. Rash noted at 2 days ago. Pt took Benadryl at home and  rash subsided but next day he had it again. No f/c, no SOB, cough, or diarrhea, no tongue or facial edema.  pt well appearing, no clinical findings to suspect infection. unclear etiology but rash appears to be allergic. Will d/c home and recommend antihistamine and prednisone, f/u with allergict.

## 2021-07-12 ENCOUNTER — APPOINTMENT (OUTPATIENT)
Dept: COLORECTAL SURGERY | Facility: CLINIC | Age: 56
End: 2021-07-12
Payer: MEDICAID

## 2021-07-12 VITALS
HEIGHT: 66 IN | HEART RATE: 78 BPM | DIASTOLIC BLOOD PRESSURE: 91 MMHG | BODY MASS INDEX: 40.82 KG/M2 | SYSTOLIC BLOOD PRESSURE: 158 MMHG | TEMPERATURE: 97.3 F | WEIGHT: 254 LBS

## 2021-07-12 PROCEDURE — 45300 PROCTOSIGMOIDOSCOPY DX: CPT

## 2021-07-12 NOTE — PHYSICAL EXAM
[Abdomen Masses] : No abdominal masses [Abdomen Tenderness] : ~T No ~M abdominal tenderness [No HSM] : no hepatosplenomegaly [Patient Refused] : exam was refused by the patient [Normal] : was normal [None] : there was no rectal mass  [No Rash or Lesion] : No rash or lesion [Alert] : alert [Calm] : calm [FreeTextEntry1] : A medical assistant was present throughout the procedure.\par \par Rigid proctosigmoidoscopy was performed after risks, benefits alternatives were outlined. The procedure was performed for evaluation of rectal bleeding.\par \par A rigid proctoscope was passed through the anus into the rectum to   8 centimeters. The mucosal surface was inspected. The patient tolerated procedure well.\par \par The findings revealed:\par No mass, polyps or lesions\par Internal hemorrhoids\par \par \par

## 2021-07-12 NOTE — ASSESSMENT
[FreeTextEntry1] : Recommend surveillance MRI.\par \par Advised me for continued surveillance. Recommend return to GI in 6 months for one year surveillance colonoscopy.\par \par \par I strongly counseled the patient and his daughter with  regarding the need for prostate biopsy with urology for prior identified MRI abnormalities. The potential underlying risk of prostate cancer and need for potential further treatment and evaluation with stress once again. Referral information given.\par \par Follow up 3-4 months

## 2021-07-12 NOTE — HISTORY OF PRESENT ILLNESS
[FreeTextEntry1] : 54 yo M Austrian and English speaking M presents w/ daughter for cT1-2N0 rectal cancer\par Diagnosed on colonoscopy 12/28/20 w/ 15 mm polyp in distal rectum, s/p resection and retrieval\par Initial CT and MRI completed 1/26/21, solitary nodule within L lobe of prostate noted. No metastatic disease\par \par s/p TAMIS rectal cancer (full thickness) 2/25/21\par \par - Rectum at 2 cm:\par  - no tumor seen\par  - focus of submucosal fibrosis \par \par Last seen 3/15/21 for post op follow up. Exam refused by patient.\par Pt referred to UROL [facility] for evaluation of prostate\par MR prostate completed 4/23/21:\par IMPRESSION: PIRADS 5 - Very high. Nodule right anterior horn peripheral zone at mid third of gland. Please note that the findings on diffusion-weighted imaging are not definitive. Also the serum PSA level is normal. Findings can be correlated with TRUS biopsy. No history of any prior prostate biopsy provided. Left peripheral zone is normal on this MR and on the prior CT.\par As per chart review, UROL recommends prostate fusion biopsy, however daughter wished to see Dr Rowland prior to procedure.\par \par Pt reports he is doing well\par He admits to pain inside rectum which is worsened w/ sitting and improved w/ position changes\par Denies bleeding or discharge\par Denies urinary complaint\par BH: daily w/ straining. Admits to constipation and takes Miralax every other day\par Denies ASA/NSAID use

## 2021-07-14 ENCOUNTER — APPOINTMENT (OUTPATIENT)
Dept: UROLOGY | Facility: CLINIC | Age: 56
End: 2021-07-14

## 2021-07-26 ENCOUNTER — APPOINTMENT (OUTPATIENT)
Dept: UROLOGY | Facility: CLINIC | Age: 56
End: 2021-07-26

## 2021-07-30 ENCOUNTER — RESULT REVIEW (OUTPATIENT)
Age: 56
End: 2021-07-30

## 2021-07-30 ENCOUNTER — APPOINTMENT (OUTPATIENT)
Dept: MRI IMAGING | Facility: CLINIC | Age: 56
End: 2021-07-30
Payer: MEDICAID

## 2021-07-30 ENCOUNTER — OUTPATIENT (OUTPATIENT)
Dept: OUTPATIENT SERVICES | Facility: HOSPITAL | Age: 56
LOS: 1 days | End: 2021-07-30

## 2021-07-30 DIAGNOSIS — Z98.890 OTHER SPECIFIED POSTPROCEDURAL STATES: Chronic | ICD-10-CM

## 2021-07-30 PROCEDURE — 72197 MRI PELVIS W/O & W/DYE: CPT | Mod: 26

## 2021-08-02 ENCOUNTER — APPOINTMENT (OUTPATIENT)
Dept: UROLOGY | Facility: CLINIC | Age: 56
End: 2021-08-02
Payer: MEDICAID

## 2021-08-02 ENCOUNTER — OUTPATIENT (OUTPATIENT)
Dept: OUTPATIENT SERVICES | Facility: HOSPITAL | Age: 56
LOS: 1 days | End: 2021-08-02
Payer: MEDICAID

## 2021-08-02 VITALS — TEMPERATURE: 98 F | DIASTOLIC BLOOD PRESSURE: 96 MMHG | SYSTOLIC BLOOD PRESSURE: 179 MMHG | HEART RATE: 81 BPM

## 2021-08-02 DIAGNOSIS — Z98.890 OTHER SPECIFIED POSTPROCEDURAL STATES: Chronic | ICD-10-CM

## 2021-08-02 PROCEDURE — 71046 X-RAY EXAM CHEST 2 VIEWS: CPT

## 2021-08-02 PROCEDURE — T1013: CPT

## 2021-08-02 PROCEDURE — 71046 X-RAY EXAM CHEST 2 VIEWS: CPT | Mod: 26

## 2021-08-02 PROCEDURE — 99214 OFFICE O/P EST MOD 30 MIN: CPT | Mod: 57

## 2021-08-02 RX ORDER — LANCETS 33 GAUGE
EACH MISCELLANEOUS
Qty: 100 | Refills: 0 | Status: ACTIVE | COMMUNITY
Start: 2021-02-23

## 2021-08-02 RX ORDER — AZELASTINE HYDROCHLORIDE 0.5 MG/ML
0.05 SOLUTION/ DROPS OPHTHALMIC
Qty: 6 | Refills: 0 | Status: ACTIVE | COMMUNITY
Start: 2021-06-30

## 2021-08-03 LAB
ALBUMIN SERPL ELPH-MCNC: 4.7 G/DL
ALP BLD-CCNC: 196 U/L
ALT SERPL-CCNC: 109 U/L
ANION GAP SERPL CALC-SCNC: 15 MMOL/L
APPEARANCE: CLEAR
APTT BLD: 32.8 SEC
AST SERPL-CCNC: 79 U/L
BACTERIA: NEGATIVE
BASOPHILS # BLD AUTO: 0.04 K/UL
BASOPHILS NFR BLD AUTO: 0.6 %
BILIRUB SERPL-MCNC: 0.6 MG/DL
BILIRUBIN URINE: NEGATIVE
BLOOD URINE: NEGATIVE
BUN SERPL-MCNC: 9 MG/DL
CALCIUM SERPL-MCNC: 9.5 MG/DL
CHLORIDE SERPL-SCNC: 98 MMOL/L
CO2 SERPL-SCNC: 24 MMOL/L
COLOR: COLORLESS
CREAT SERPL-MCNC: 0.52 MG/DL
EOSINOPHIL # BLD AUTO: 0.14 K/UL
EOSINOPHIL NFR BLD AUTO: 2.2 %
GLUCOSE QUALITATIVE U: ABNORMAL
GLUCOSE SERPL-MCNC: 295 MG/DL
HCT VFR BLD CALC: 47.7 %
HGB BLD-MCNC: 16.1 G/DL
HYALINE CASTS: 0 /LPF
IMM GRANULOCYTES NFR BLD AUTO: 0.5 %
INR PPP: 0.95 RATIO
KETONES URINE: NEGATIVE
LEUKOCYTE ESTERASE URINE: NEGATIVE
LYMPHOCYTES # BLD AUTO: 2.16 K/UL
LYMPHOCYTES NFR BLD AUTO: 33.5 %
MAN DIFF?: NORMAL
MCHC RBC-ENTMCNC: 30.3 PG
MCHC RBC-ENTMCNC: 33.8 GM/DL
MCV RBC AUTO: 89.8 FL
MICROSCOPIC-UA: NORMAL
MONOCYTES # BLD AUTO: 0.62 K/UL
MONOCYTES NFR BLD AUTO: 9.6 %
NEUTROPHILS # BLD AUTO: 3.45 K/UL
NEUTROPHILS NFR BLD AUTO: 53.6 %
NITRITE URINE: NEGATIVE
PH URINE: 6.5
PLATELET # BLD AUTO: 149 K/UL
POTASSIUM SERPL-SCNC: 3.9 MMOL/L
PROT SERPL-MCNC: 8 G/DL
PROTEIN URINE: NEGATIVE
PSA SERPL-MCNC: 1.01 NG/ML
PT BLD: 11.3 SEC
RBC # BLD: 5.31 M/UL
RBC # FLD: 13 %
RED BLOOD CELLS URINE: 0 /HPF
SODIUM SERPL-SCNC: 137 MMOL/L
SPECIFIC GRAVITY URINE: 1.03
SQUAMOUS EPITHELIAL CELLS: 1 /HPF
UROBILINOGEN URINE: NORMAL
WBC # FLD AUTO: 6.44 K/UL
WHITE BLOOD CELLS URINE: 0 /HPF

## 2021-08-03 NOTE — ASSESSMENT
[FreeTextEntry1] : 55 year old Icelandic male with nodule on prostate found after work up for colon cancer\par MRI imaging and report reviewed\par 17mm PI-RADS 5 lesion, high suspicion for cancer\par Recommend MRI fusion biopsy\par Reviewed procedure, indications and risks\par Medical clearance

## 2021-08-03 NOTE — HISTORY OF PRESENT ILLNESS
[FreeTextEntry1] : This is a 55 year old male who presents today as referral from Dr. Rowland after CT finding of solitary nodule within left lobe of the prostate gland.  He is in the process of being treated for rectal adenocarcinoma. \par Recent PSA 1.03 done on 2/4/21\par \par He reports Nocturia x 4-5 for the past 6 months, which he state prior was 0\par No hematuria, no dysuria\par Occasional lower back pain, can be severe at time, resolves without intervention.\par \par He is currently scheduled to undergo transanal excision of the polypectomy on 2/25/21\par \par Social Hx: nonsmoker, rare ETOH use, works in a restaurant\par Family Hx: no CaP, unusure of any other cancer within family\par \par 8/02/21 Here for f/u. MRI showed PI-RADS 5 lesion right anterior horn, 17mm in greatest dimension. Here to discuss MRI fusion biopsy. Recent MRI pelvis for rectal ca shows no evidence of recurrence.  [Urinary Incontinence] : no urinary incontinence [Urinary Retention] : no urinary retention [Urinary Urgency] : no urinary urgency [Urinary Frequency] : no urinary frequency [Nocturia] : nocturia [Straining] : no straining [Weak Stream] : no weak stream [Intermittency] : no intermittency [Post-Void Dribbling] : no post-void dribbling [Dysuria] : no dysuria [Hematuria - Gross] : no gross hematuria [Flank Pain] : no flank pain [Fever] : no fever [Fatigue] : no fatigue [Nausea] : no nausea [Anorexia] : no anorexia [None] : None

## 2021-08-04 LAB — BACTERIA UR CULT: NORMAL

## 2021-08-26 NOTE — ED ADULT NURSE NOTE - CAS DISCH CONDITION
Stable Peng Advancement Flap Text: The defect edges were debeveled with a #15 scalpel blade.  Given the location of the defect, shape of the defect and the proximity to free margins a Peng advancement flap was deemed most appropriate.  Using a sterile surgical marker, an appropriate advancement flap was drawn incorporating the defect and placing the expected incisions within the relaxed skin tension lines where possible. The area thus outlined was incised deep to adipose tissue with a #15 scalpel blade.  The skin margins were undermined to an appropriate distance in all directions utilizing iris scissors.

## 2021-08-30 ENCOUNTER — NON-APPOINTMENT (OUTPATIENT)
Age: 56
End: 2021-08-30

## 2021-08-31 ENCOUNTER — OUTPATIENT (OUTPATIENT)
Dept: OUTPATIENT SERVICES | Facility: HOSPITAL | Age: 56
LOS: 1 days | Discharge: HOME | End: 2021-08-31
Payer: MEDICAID

## 2021-08-31 ENCOUNTER — RESULT REVIEW (OUTPATIENT)
Age: 56
End: 2021-08-31

## 2021-08-31 ENCOUNTER — APPOINTMENT (OUTPATIENT)
Dept: UROLOGY | Facility: AMBULATORY SURGERY CENTER | Age: 56
End: 2021-08-31

## 2021-08-31 DIAGNOSIS — Z98.890 OTHER SPECIFIED POSTPROCEDURAL STATES: Chronic | ICD-10-CM

## 2021-08-31 LAB
GLUCOSE BLDC GLUCOMTR-MCNC: 137 MG/DL — HIGH (ref 70–99)
SARS-COV-2 N GENE NPH QL NAA+PROBE: NOT DETECTED

## 2021-08-31 PROCEDURE — 76942 ECHO GUIDE FOR BIOPSY: CPT | Mod: 26,59

## 2021-08-31 PROCEDURE — 76377 3D RENDER W/INTRP POSTPROCES: CPT | Mod: 26

## 2021-08-31 PROCEDURE — 55706 BX PRST8 NDL SAT SAMPLING: CPT | Mod: 22

## 2021-08-31 PROCEDURE — 88305 TISSUE EXAM BY PATHOLOGIST: CPT | Mod: 26

## 2021-08-31 PROCEDURE — 76872 US TRANSRECTAL: CPT | Mod: 26

## 2021-09-03 ENCOUNTER — NON-APPOINTMENT (OUTPATIENT)
Age: 56
End: 2021-09-03

## 2021-09-03 LAB — SURGICAL PATHOLOGY STUDY: SIGNIFICANT CHANGE UP

## 2021-10-18 ENCOUNTER — APPOINTMENT (OUTPATIENT)
Dept: UROLOGY | Facility: CLINIC | Age: 56
End: 2021-10-18

## 2021-11-05 ENCOUNTER — APPOINTMENT (OUTPATIENT)
Dept: COLORECTAL SURGERY | Facility: CLINIC | Age: 56
End: 2021-11-05
Payer: MEDICAID

## 2021-11-05 VITALS
DIASTOLIC BLOOD PRESSURE: 76 MMHG | HEART RATE: 79 BPM | BODY MASS INDEX: 40.5 KG/M2 | SYSTOLIC BLOOD PRESSURE: 116 MMHG | TEMPERATURE: 95.7 F | WEIGHT: 252 LBS | HEIGHT: 66 IN

## 2021-11-05 PROCEDURE — 45300 PROCTOSIGMOIDOSCOPY DX: CPT

## 2021-11-05 PROCEDURE — 99214 OFFICE O/P EST MOD 30 MIN: CPT | Mod: 25

## 2021-11-05 NOTE — HISTORY OF PRESENT ILLNESS
[FreeTextEntry1] : 54 y/o Andorran and English speaking M presents with daughter for cT1-2N0 rectal cancer\par Diagnosed on colonoscopy 12/28/20 w/ 15 mm polyp in distal rectum, s/p resection and retrieval\par Initial CT and MRI completed 1/26/21, solitary nodule within L lobe of prostate noted. No metastatic disease\par \par s/p TAMIS rectal cancer (full thickness) 2/25/21\par \par Last seen in the office on 7/12/21. Rigid proctoscope was passed to 8 cm. No mass, polyps or lesions. Internal hemorrhoids noted. \par \par MRI pelvis completed 8/5/21\par - Since the prior examination 1/22/2021, the primary tumor shows: no identifiable tumor\par - Post Treatment Stage: yT0 / N0\par - Sphincter involvement (low rectal tumors): Absent\par - Suspicious extra mesorectal nodes: No\par \par Reports occasional mild lower abdominal pain that comes and goes, lasting for a few seconds at a times. Denies aggravating or alleviating factors\par C/o occasional back-sided/rectal pain when sitting for prolonged periods of times. Upon clarifying, seems to be joint pain and stiffness, improves with standing and moving positions \par Denies rectal bleeding or drainage, fever, or chills\par Denies N/V, change in appetite or weight\par Energy levels are adequate \par BH:BID\par Denies constipation or diarrhea. Admits to occasional straining to initiate BM\par Taking Miralax 1 capful daily\par Working on dietary fiber intake. Currently drinking ~ 1 gallon of water daily \par \par Last colonoscopy completed 12/28/20 with Dr. Alston. has not scheduled next colonoscopy yet\par No ASA/NSAIDs last 7 days

## 2021-11-05 NOTE — PHYSICAL EXAM
[Abdomen Masses] : No abdominal masses [Abdomen Tenderness] : ~T No ~M abdominal tenderness [No HSM] : no hepatosplenomegaly [Patient Refused] : exam was refused by the patient [Excoriation] : no perianal excoriation [Skin Tags] : there were no residual hemorrhoidal skin tags seen [Normal] : was normal [None] : there was no rectal mass  [No Rash or Lesion] : No rash or lesion [Alert] : alert [Calm] : calm [FreeTextEntry1] : A medical assistant was present throughout the procedure.\par \par Rigid proctosigmoidoscopy was performed after risks, benefits alternatives were outlined. The procedure was performed for evaluation of rectal bleeding.\par \par A rigid proctoscope was passed through the anus into the rectum to   12  centimeters. The mucosal surface was inspected. The patient tolerated procedure well.\par \par The findings revealed:\par No masses or lesions.\par Internal hemorrhoids\par \par \par

## 2021-11-24 ENCOUNTER — EMERGENCY (EMERGENCY)
Facility: HOSPITAL | Age: 56
LOS: 1 days | Discharge: HOME | End: 2021-11-24
Attending: EMERGENCY MEDICINE | Admitting: EMERGENCY MEDICINE
Payer: MEDICAID

## 2021-11-24 VITALS
DIASTOLIC BLOOD PRESSURE: 92 MMHG | TEMPERATURE: 98 F | WEIGHT: 251.99 LBS | HEIGHT: 66 IN | RESPIRATION RATE: 18 BRPM | OXYGEN SATURATION: 96 % | HEART RATE: 77 BPM | SYSTOLIC BLOOD PRESSURE: 168 MMHG
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
## 2021-11-24 DIAGNOSIS — S91.302A UNSPECIFIED OPEN WOUND, LEFT FOOT, INITIAL ENCOUNTER: ICD-10-CM

## 2021-11-24 DIAGNOSIS — Z79.84 LONG TERM (CURRENT) USE OF ORAL HYPOGLYCEMIC DRUGS: ICD-10-CM
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
## 2021-11-24 DIAGNOSIS — E11.9 TYPE 2 DIABETES MELLITUS WITHOUT COMPLICATIONS: ICD-10-CM

## 2021-11-24 DIAGNOSIS — Z98.890 OTHER SPECIFIED POSTPROCEDURAL STATES: Chronic | ICD-10-CM

## 2021-11-24 PROCEDURE — 99283 EMERGENCY DEPT VISIT LOW MDM: CPT

## 2021-11-24 RX ORDER — AZTREONAM 2 G
1 VIAL (EA) INJECTION
Qty: 14 | Refills: 0
Start: 2021-11-24 | End: 2021-11-30

## 2021-11-24 RX ORDER — CEPHALEXIN 500 MG
1 CAPSULE ORAL
Qty: 28 | Refills: 0
Start: 2021-11-24 | End: 2021-11-30

## 2021-11-24 NOTE — ED PROVIDER NOTE - CLINICAL SUMMARY MEDICAL DECISION MAKING FREE TEXT BOX
wound between 2nd and third digit, given pain travelling up foot per pt, but no crepitus, streaking and hx of DM, will initiate on abx, to keep clean continue antiseptic/topical abx ointment, fu w podiatry. Discussed with pt results of work up, strict return precautions, and need for follow up.  Pt expressed understanding and agrees with plan. DC with PI [id number]

## 2021-11-24 NOTE — ED PROVIDER NOTE - PHYSICAL EXAMINATION
CONSTITUTIONAL: Awake, alert and in no apparent distress.  HEENT: Head is atraumatic. Eyes clear bilaterally, normal EOMI. Airway patent.  MUSCULOSKELETAL: Spine appears normal, no midline spinal tenderness, range of motion is not limited, no muscle or joint tenderness. no bony tenderness. 0.5cm wound between 2nd and third digit webspace, no active drainage. no crepitus to foot, no streaking, redness  NEUROLOGICAL: Alert, no focal deficits, no motor or sensory deficits.  SKIN: Skin normal color for race, warm, dry and intact. No evidence of rash.  PSYCHIATRIC: Normal mood and affect. no apparent risk to self or others.

## 2021-11-24 NOTE — ED PROVIDER NOTE - OBJECTIVE STATEMENT
55 yo hx of DM with complaints of wound between 2nd and third digit of L foot, has been placing topical antiseptic ointment. Pain started to travel up foot so came to ED over the last few days, some pain w ambulating in area. Denies fevers, numbness, falls, or any other complaints.

## 2021-11-24 NOTE — ED PROVIDER NOTE - PATIENT PORTAL LINK FT
You can access the FollowMyHealth Patient Portal offered by [facility] by registering at the following website: [email or web address]. By joining [employer]’s FollowMyHealth portal, you will also be able to view your health information using other applications (apps) compatible with our system.

## 2021-11-24 NOTE — ED ADULT NURSE NOTE - OBJECTIVE STATEMENT
pt is a 57 y/o M arriving to ED for c/c wound between Lt 2nd and 3rd toes. hx HTN, HLD, DM on Metformin. c/o of wound to L foot which started 5 days ago. pt states wound started as a pustulant blister, which he popped and wound then became more painful. No cellulitis noted to site. No f/c, n/v/d, cough, chills, or other complaints at this time.

## 2021-11-24 NOTE — ED PROVIDER NOTE - NSFOLLOWUPCLINICS_GEN_ALL_ED_FT
[facility] Pod[facility]  Podiatry  [address] [place] [address]  Phone: [phone]  Fax:

## 2021-11-24 NOTE — ED ADULT TRIAGE NOTE - CHIEF COMPLAINT QUOTE
Pt presents to ED w/ c/o of wound to L foot (in between 2nd and 3rd toe) started 5 days ago. Pt states throbbing like pain, picked at it and now has noticed pus to wound site.

## 2022-01-01 ENCOUNTER — APPOINTMENT (OUTPATIENT)
Dept: INFUSION THERAPY | Facility: CLINIC | Age: 57
End: 2022-01-01

## 2022-01-01 ENCOUNTER — NON-APPOINTMENT (OUTPATIENT)
Age: 57
End: 2022-01-01

## 2022-01-01 ENCOUNTER — RESULT REVIEW (OUTPATIENT)
Age: 57
End: 2022-01-01

## 2022-01-01 ENCOUNTER — LABORATORY RESULT (OUTPATIENT)
Age: 57
End: 2022-01-01

## 2022-01-01 ENCOUNTER — APPOINTMENT (OUTPATIENT)
Dept: HEMATOLOGY ONCOLOGY | Facility: CLINIC | Age: 57
End: 2022-01-01

## 2022-01-01 ENCOUNTER — APPOINTMENT (OUTPATIENT)
Dept: INTERVENTIONAL RADIOLOGY/VASCULAR | Facility: HOSPITAL | Age: 57
End: 2022-01-01

## 2022-01-01 ENCOUNTER — OUTPATIENT (OUTPATIENT)
Dept: OUTPATIENT SERVICES | Facility: HOSPITAL | Age: 57
LOS: 1 days | End: 2022-01-01
Payer: MEDICAID

## 2022-01-01 ENCOUNTER — APPOINTMENT (OUTPATIENT)
Dept: COLORECTAL SURGERY | Facility: CLINIC | Age: 57
End: 2022-01-01

## 2022-01-01 ENCOUNTER — OUTPATIENT (OUTPATIENT)
Dept: OUTPATIENT SERVICES | Facility: HOSPITAL | Age: 57
LOS: 1 days | End: 2022-01-01

## 2022-01-01 ENCOUNTER — APPOINTMENT (OUTPATIENT)
Dept: MRI IMAGING | Facility: CLINIC | Age: 57
End: 2022-01-01

## 2022-01-01 ENCOUNTER — APPOINTMENT (OUTPATIENT)
Dept: INTERVENTIONAL RADIOLOGY/VASCULAR | Facility: HOSPITAL | Age: 57
End: 2022-01-01
Payer: COMMERCIAL

## 2022-01-01 ENCOUNTER — APPOINTMENT (OUTPATIENT)
Dept: GASTROENTEROLOGY | Facility: CLINIC | Age: 57
End: 2022-01-01

## 2022-01-01 ENCOUNTER — EMERGENCY (EMERGENCY)
Facility: HOSPITAL | Age: 57
LOS: 1 days | Discharge: HOME | End: 2022-01-01
Attending: EMERGENCY MEDICINE | Admitting: EMERGENCY MEDICINE
Payer: MEDICAID

## 2022-01-01 ENCOUNTER — APPOINTMENT (OUTPATIENT)
Dept: INTERVENTIONAL RADIOLOGY/VASCULAR | Facility: HOSPITAL | Age: 57
End: 2022-01-01
Payer: MEDICAID

## 2022-01-01 ENCOUNTER — APPOINTMENT (OUTPATIENT)
Dept: UROLOGY | Facility: CLINIC | Age: 57
End: 2022-01-01

## 2022-01-01 ENCOUNTER — APPOINTMENT (OUTPATIENT)
Dept: NUCLEAR MEDICINE | Facility: HOSPITAL | Age: 57
End: 2022-01-01

## 2022-01-01 VITALS — TEMPERATURE: 97.7 F | HEART RATE: 71 BPM | DIASTOLIC BLOOD PRESSURE: 82 MMHG | SYSTOLIC BLOOD PRESSURE: 149 MMHG

## 2022-01-01 VITALS
BODY MASS INDEX: 40.18 KG/M2 | RESPIRATION RATE: 18 BRPM | DIASTOLIC BLOOD PRESSURE: 78 MMHG | HEIGHT: 66 IN | SYSTOLIC BLOOD PRESSURE: 135 MMHG | HEART RATE: 77 BPM | WEIGHT: 250 LBS | TEMPERATURE: 97.9 F | OXYGEN SATURATION: 95 %

## 2022-01-01 VITALS
HEART RATE: 74 BPM | BODY MASS INDEX: 40.18 KG/M2 | HEIGHT: 66 IN | WEIGHT: 250 LBS | SYSTOLIC BLOOD PRESSURE: 117 MMHG | TEMPERATURE: 96 F | RESPIRATION RATE: 18 BRPM | OXYGEN SATURATION: 97 % | DIASTOLIC BLOOD PRESSURE: 70 MMHG

## 2022-01-01 VITALS
DIASTOLIC BLOOD PRESSURE: 81 MMHG | TEMPERATURE: 97.2 F | HEART RATE: 72 BPM | BODY MASS INDEX: 40.02 KG/M2 | RESPIRATION RATE: 18 BRPM | WEIGHT: 249 LBS | HEIGHT: 66 IN | SYSTOLIC BLOOD PRESSURE: 135 MMHG | OXYGEN SATURATION: 96 %

## 2022-01-01 VITALS
RESPIRATION RATE: 18 BRPM | OXYGEN SATURATION: 98 % | HEIGHT: 66 IN | HEART RATE: 78 BPM | BODY MASS INDEX: 40.66 KG/M2 | WEIGHT: 253 LBS | TEMPERATURE: 97.9 F | SYSTOLIC BLOOD PRESSURE: 144 MMHG | DIASTOLIC BLOOD PRESSURE: 81 MMHG

## 2022-01-01 VITALS
BODY MASS INDEX: 41.62 KG/M2 | HEIGHT: 66 IN | HEART RATE: 90 BPM | TEMPERATURE: 97.9 F | SYSTOLIC BLOOD PRESSURE: 167 MMHG | WEIGHT: 259 LBS | DIASTOLIC BLOOD PRESSURE: 92 MMHG

## 2022-01-01 VITALS
HEART RATE: 75 BPM | DIASTOLIC BLOOD PRESSURE: 91 MMHG | TEMPERATURE: 98 F | HEIGHT: 66 IN | WEIGHT: 251.99 LBS | SYSTOLIC BLOOD PRESSURE: 160 MMHG | RESPIRATION RATE: 16 BRPM

## 2022-01-01 VITALS
SYSTOLIC BLOOD PRESSURE: 140 MMHG | HEART RATE: 60 BPM | OXYGEN SATURATION: 96 % | DIASTOLIC BLOOD PRESSURE: 75 MMHG | RESPIRATION RATE: 16 BRPM

## 2022-01-01 VITALS — WEIGHT: 253.53 LBS

## 2022-01-01 DIAGNOSIS — Z20.822 CONTACT WITH AND (SUSPECTED) EXPOSURE TO COVID-19: ICD-10-CM

## 2022-01-01 DIAGNOSIS — Z98.890 OTHER SPECIFIED POSTPROCEDURAL STATES: Chronic | ICD-10-CM

## 2022-01-01 DIAGNOSIS — R97.0 ELEVATED CARCINOEMBRYONIC ANTIGEN [CEA]: ICD-10-CM

## 2022-01-01 DIAGNOSIS — K59.00 CONSTIPATION, UNSPECIFIED: ICD-10-CM

## 2022-01-01 DIAGNOSIS — F10.10 ALCOHOL ABUSE, UNCOMPLICATED: ICD-10-CM

## 2022-01-01 DIAGNOSIS — R10.9 UNSPECIFIED ABDOMINAL PAIN: ICD-10-CM

## 2022-01-01 DIAGNOSIS — R74.01 ELEVATION OF LEVELS OF LIVER TRANSAMINASE LEVELS: ICD-10-CM

## 2022-01-01 DIAGNOSIS — K76.89 OTHER SPECIFIED DISEASES OF LIVER: ICD-10-CM

## 2022-01-01 DIAGNOSIS — R60.0 LOCALIZED EDEMA: ICD-10-CM

## 2022-01-01 DIAGNOSIS — F41.9 ANXIETY DISORDER, UNSPECIFIED: ICD-10-CM

## 2022-01-01 DIAGNOSIS — R12 HEARTBURN: ICD-10-CM

## 2022-01-01 DIAGNOSIS — G89.18 OTHER ACUTE POSTPROCEDURAL PAIN: ICD-10-CM

## 2022-01-01 DIAGNOSIS — C22.1 INTRAHEPATIC BILE DUCT CARCINOMA: ICD-10-CM

## 2022-01-01 DIAGNOSIS — R97.8 OTHER ABNORMAL TUMOR MARKERS: ICD-10-CM

## 2022-01-01 DIAGNOSIS — K74.60 UNSPECIFIED CIRRHOSIS OF LIVER: ICD-10-CM

## 2022-01-01 LAB
AFP-TM SERPL-MCNC: 4.4 NG/ML
ALBUMIN SERPL ELPH-MCNC: 3.6 G/DL
ALBUMIN SERPL ELPH-MCNC: 4.3 G/DL — SIGNIFICANT CHANGE UP (ref 3.3–5)
ALBUMIN SERPL ELPH-MCNC: 4.4 G/DL
ALBUMIN SERPL ELPH-MCNC: 4.4 G/DL — SIGNIFICANT CHANGE UP (ref 3.3–5)
ALBUMIN SERPL ELPH-MCNC: 4.6 G/DL
ALBUMIN SERPL ELPH-MCNC: 4.8 G/DL
ALP BLD-CCNC: 159 U/L
ALP BLD-CCNC: 207 U/L
ALP BLD-CCNC: 211 U/L
ALP BLD-CCNC: 230 U/L
ALP SERPL-CCNC: 223 U/L — HIGH (ref 40–120)
ALP SERPL-CCNC: 290 U/L — HIGH (ref 40–120)
ALT FLD-CCNC: 80 U/L — HIGH (ref 10–45)
ALT FLD-CCNC: 80 U/L — HIGH (ref 10–45)
ALT SERPL-CCNC: 101 U/L
ALT SERPL-CCNC: 108 U/L
ALT SERPL-CCNC: 125 U/L
ALT SERPL-CCNC: 61 U/L
ANION GAP SERPL CALC-SCNC: 10 MMOL/L
ANION GAP SERPL CALC-SCNC: 11 MMOL/L
ANION GAP SERPL CALC-SCNC: 12 MMOL/L — SIGNIFICANT CHANGE UP (ref 5–17)
ANION GAP SERPL CALC-SCNC: 13 MMOL/L
ANION GAP SERPL CALC-SCNC: 17 MMOL/L
ANION GAP SERPL CALC-SCNC: 9 MMOL/L — SIGNIFICANT CHANGE UP (ref 5–17)
APTT BLD: 34.1 SEC — SIGNIFICANT CHANGE UP (ref 27.5–35.5)
APTT BLD: 34.9 SEC
AST SERPL-CCNC: 102 U/L
AST SERPL-CCNC: 112 U/L
AST SERPL-CCNC: 134 U/L
AST SERPL-CCNC: 66 U/L
AST SERPL-CCNC: 79 U/L — HIGH (ref 10–40)
AST SERPL-CCNC: 81 U/L — HIGH (ref 10–40)
BASOPHILS # BLD AUTO: 0.04 K/UL
BASOPHILS # BLD AUTO: 0.05 K/UL
BASOPHILS # BLD AUTO: 0.05 K/UL — SIGNIFICANT CHANGE UP (ref 0–0.2)
BASOPHILS NFR BLD AUTO: 0.6 %
BASOPHILS NFR BLD AUTO: 0.9 %
BASOPHILS NFR BLD AUTO: 0.9 % — SIGNIFICANT CHANGE UP (ref 0–2)
BILIRUB SERPL-MCNC: 0.5 MG/DL — SIGNIFICANT CHANGE UP (ref 0.2–1.2)
BILIRUB SERPL-MCNC: 0.6 MG/DL
BILIRUB SERPL-MCNC: 0.6 MG/DL
BILIRUB SERPL-MCNC: 0.6 MG/DL — SIGNIFICANT CHANGE UP (ref 0.2–1.2)
BILIRUB SERPL-MCNC: 0.8 MG/DL
BILIRUB SERPL-MCNC: 0.9 MG/DL
BUN SERPL-MCNC: 11 MG/DL
BUN SERPL-MCNC: 11 MG/DL
BUN SERPL-MCNC: 11 MG/DL — SIGNIFICANT CHANGE UP (ref 7–23)
BUN SERPL-MCNC: 11 MG/DL — SIGNIFICANT CHANGE UP (ref 7–23)
BUN SERPL-MCNC: 12 MG/DL
BUN SERPL-MCNC: 9 MG/DL
CALCIUM SERPL-MCNC: 9 MG/DL
CALCIUM SERPL-MCNC: 9.2 MG/DL — SIGNIFICANT CHANGE UP (ref 8.4–10.5)
CALCIUM SERPL-MCNC: 9.4 MG/DL
CALCIUM SERPL-MCNC: 9.5 MG/DL — SIGNIFICANT CHANGE UP (ref 8.4–10.5)
CALCIUM SERPL-MCNC: 9.7 MG/DL
CALCIUM SERPL-MCNC: 9.8 MG/DL
CANCER AG19-9 SERPL-ACNC: 175 U/ML
CANCER AG19-9 SERPL-ACNC: 216 U/ML
CEA SERPL-MCNC: 17 NG/ML
CEA SERPL-MCNC: 22.8 NG/ML
CHLORIDE SERPL-SCNC: 100 MMOL/L
CHLORIDE SERPL-SCNC: 101 MMOL/L — SIGNIFICANT CHANGE UP (ref 96–108)
CHLORIDE SERPL-SCNC: 102 MMOL/L
CHLORIDE SERPL-SCNC: 103 MMOL/L
CHLORIDE SERPL-SCNC: 103 MMOL/L — SIGNIFICANT CHANGE UP (ref 96–108)
CHLORIDE SERPL-SCNC: 99 MMOL/L
CO2 SERPL-SCNC: 22 MMOL/L
CO2 SERPL-SCNC: 22 MMOL/L — SIGNIFICANT CHANGE UP (ref 22–31)
CO2 SERPL-SCNC: 23 MMOL/L
CO2 SERPL-SCNC: 26 MMOL/L
CO2 SERPL-SCNC: 27 MMOL/L
CO2 SERPL-SCNC: 28 MMOL/L — SIGNIFICANT CHANGE UP (ref 22–31)
CREAT SERPL-MCNC: 0.45 MG/DL
CREAT SERPL-MCNC: 0.46 MG/DL
CREAT SERPL-MCNC: 0.46 MG/DL — LOW (ref 0.5–1.3)
CREAT SERPL-MCNC: 0.5 MG/DL
CREAT SERPL-MCNC: 0.52 MG/DL — SIGNIFICANT CHANGE UP (ref 0.5–1.3)
CREAT SERPL-MCNC: 0.55 MG/DL
EGFR: 117 ML/MIN/1.73M2
EGFR: 119 ML/MIN/1.73M2 — SIGNIFICANT CHANGE UP
EGFR: 120 ML/MIN/1.73M2
EGFR: 124 ML/MIN/1.73M2
EGFR: 124 ML/MIN/1.73M2
EGFR: 124 ML/MIN/1.73M2 — SIGNIFICANT CHANGE UP
EOSINOPHIL # BLD AUTO: 0.11 K/UL
EOSINOPHIL # BLD AUTO: 0.14 K/UL — SIGNIFICANT CHANGE UP (ref 0–0.5)
EOSINOPHIL # BLD AUTO: 0.16 K/UL
EOSINOPHIL NFR BLD AUTO: 1.9 %
EOSINOPHIL NFR BLD AUTO: 2.3 %
EOSINOPHIL NFR BLD AUTO: 2.4 % — SIGNIFICANT CHANGE UP (ref 0–6)
GLUCOSE BLDC GLUCOMTR-MCNC: 121 MG/DL — HIGH (ref 70–99)
GLUCOSE BLDC GLUCOMTR-MCNC: 128 MG/DL — HIGH (ref 70–99)
GLUCOSE SERPL-MCNC: 114 MG/DL
GLUCOSE SERPL-MCNC: 125 MG/DL — HIGH (ref 70–99)
GLUCOSE SERPL-MCNC: 163 MG/DL
GLUCOSE SERPL-MCNC: 203 MG/DL
GLUCOSE SERPL-MCNC: 226 MG/DL — HIGH (ref 70–99)
GLUCOSE SERPL-MCNC: 238 MG/DL
HBV CORE IGG+IGM SER QL: NONREACTIVE
HBV SURFACE AB SER QL: NONREACTIVE
HBV SURFACE AG SER QL: NONREACTIVE
HCT VFR BLD CALC: 43.1 % — SIGNIFICANT CHANGE UP (ref 39–50)
HCT VFR BLD CALC: 46.1 %
HCT VFR BLD CALC: 48 %
HCV AB SER QL: NONREACTIVE
HCV S/CO RATIO: 0.04 S/CO
HGB BLD-MCNC: 14.5 G/DL — SIGNIFICANT CHANGE UP (ref 13–17)
HGB BLD-MCNC: 15.4 G/DL
HGB BLD-MCNC: 16.4 G/DL
IMM GRANULOCYTES NFR BLD AUTO: 0.2 % — SIGNIFICANT CHANGE UP (ref 0–0.9)
IMM GRANULOCYTES NFR BLD AUTO: 0.3 %
IMM GRANULOCYTES NFR BLD AUTO: 0.5 %
INR BLD: 1.04 — SIGNIFICANT CHANGE UP (ref 0.88–1.16)
INR PPP: 0.99
LYMPHOCYTES # BLD AUTO: 1.83 K/UL — SIGNIFICANT CHANGE UP (ref 1–3.3)
LYMPHOCYTES # BLD AUTO: 2 K/UL
LYMPHOCYTES # BLD AUTO: 2.25 K/UL
LYMPHOCYTES # BLD AUTO: 32 % — SIGNIFICANT CHANGE UP (ref 13–44)
LYMPHOCYTES NFR BLD AUTO: 31.8 %
LYMPHOCYTES NFR BLD AUTO: 34.4 %
MAN DIFF?: NORMAL
MAN DIFF?: NORMAL
MCHC RBC-ENTMCNC: 30.9 PG — SIGNIFICANT CHANGE UP (ref 27–34)
MCHC RBC-ENTMCNC: 31 PG
MCHC RBC-ENTMCNC: 31.3 PG
MCHC RBC-ENTMCNC: 33.4 GM/DL
MCHC RBC-ENTMCNC: 33.6 GM/DL — SIGNIFICANT CHANGE UP (ref 32–36)
MCHC RBC-ENTMCNC: 34.2 GM/DL
MCV RBC AUTO: 91.6 FL
MCV RBC AUTO: 91.9 FL — SIGNIFICANT CHANGE UP (ref 80–100)
MCV RBC AUTO: 92.8 FL
MONOCYTES # BLD AUTO: 0.55 K/UL — SIGNIFICANT CHANGE UP (ref 0–0.9)
MONOCYTES # BLD AUTO: 0.62 K/UL
MONOCYTES # BLD AUTO: 0.63 K/UL
MONOCYTES NFR BLD AUTO: 10.7 %
MONOCYTES NFR BLD AUTO: 8.9 %
MONOCYTES NFR BLD AUTO: 9.6 % — SIGNIFICANT CHANGE UP (ref 2–14)
NEUTROPHILS # BLD AUTO: 3.01 K/UL
NEUTROPHILS # BLD AUTO: 3.14 K/UL — SIGNIFICANT CHANGE UP (ref 1.8–7.4)
NEUTROPHILS # BLD AUTO: 3.98 K/UL
NEUTROPHILS NFR BLD AUTO: 51.6 %
NEUTROPHILS NFR BLD AUTO: 54.9 % — SIGNIFICANT CHANGE UP (ref 43–77)
NEUTROPHILS NFR BLD AUTO: 56.1 %
NON-GYNECOLOGICAL CYTOLOGY STUDY: SIGNIFICANT CHANGE UP
NRBC # BLD: 0 /100 WBCS — SIGNIFICANT CHANGE UP (ref 0–0)
PLATELET # BLD AUTO: 122 K/UL
PLATELET # BLD AUTO: 148 K/UL — LOW (ref 150–400)
PLATELET # BLD AUTO: 172 K/UL
POTASSIUM SERPL-MCNC: 4.2 MMOL/L — SIGNIFICANT CHANGE UP (ref 3.5–5.3)
POTASSIUM SERPL-MCNC: 4.2 MMOL/L — SIGNIFICANT CHANGE UP (ref 3.5–5.3)
POTASSIUM SERPL-SCNC: 3.5 MMOL/L
POTASSIUM SERPL-SCNC: 3.9 MMOL/L
POTASSIUM SERPL-SCNC: 4 MMOL/L
POTASSIUM SERPL-SCNC: 4.1 MMOL/L
POTASSIUM SERPL-SCNC: 4.2 MMOL/L — SIGNIFICANT CHANGE UP (ref 3.5–5.3)
POTASSIUM SERPL-SCNC: 4.2 MMOL/L — SIGNIFICANT CHANGE UP (ref 3.5–5.3)
PROT SERPL-MCNC: 7.7 G/DL
PROT SERPL-MCNC: 7.7 G/DL
PROT SERPL-MCNC: 7.9 G/DL — SIGNIFICANT CHANGE UP (ref 6–8.3)
PROT SERPL-MCNC: 8.1 G/DL
PROT SERPL-MCNC: 8.1 G/DL — SIGNIFICANT CHANGE UP (ref 6–8.3)
PROT SERPL-MCNC: 8.8 G/DL
PROTHROM AB SERPL-ACNC: 12.4 SEC — SIGNIFICANT CHANGE UP (ref 10.5–13.4)
PSA SERPL-MCNC: 1.14 NG/ML
PT BLD: 11.8 SEC
RBC # BLD: 4.69 M/UL — SIGNIFICANT CHANGE UP (ref 4.2–5.8)
RBC # BLD: 4.97 M/UL
RBC # BLD: 5.24 M/UL
RBC # FLD: 12 %
RBC # FLD: 12.1 % — SIGNIFICANT CHANGE UP (ref 10.3–14.5)
RBC # FLD: 13.2 %
SARS-COV-2 RNA SPEC QL NAA+PROBE: NEGATIVE — SIGNIFICANT CHANGE UP
SARS-COV-2 RNA SPEC QL NAA+PROBE: SIGNIFICANT CHANGE UP
SODIUM SERPL-SCNC: 136 MMOL/L
SODIUM SERPL-SCNC: 137 MMOL/L
SODIUM SERPL-SCNC: 137 MMOL/L — SIGNIFICANT CHANGE UP (ref 135–145)
SODIUM SERPL-SCNC: 138 MMOL/L
SODIUM SERPL-SCNC: 138 MMOL/L — SIGNIFICANT CHANGE UP (ref 135–145)
SODIUM SERPL-SCNC: 141 MMOL/L
SURGICAL PATHOLOGY STUDY: SIGNIFICANT CHANGE UP
WBC # BLD: 5.72 K/UL — SIGNIFICANT CHANGE UP (ref 3.8–10.5)
WBC # FLD AUTO: 5.72 K/UL — SIGNIFICANT CHANGE UP (ref 3.8–10.5)
WBC # FLD AUTO: 5.82 K/UL
WBC # FLD AUTO: 7.08 K/UL

## 2022-01-01 PROCEDURE — 36247 INS CATH ABD/L-EXT ART 3RD: CPT | Mod: 59

## 2022-01-01 PROCEDURE — C1887: CPT

## 2022-01-01 PROCEDURE — 78830 RP LOCLZJ TUM SPECT W/CT 1: CPT | Mod: 26

## 2022-01-01 PROCEDURE — 78830 RP LOCLZJ TUM SPECT W/CT 1: CPT

## 2022-01-01 PROCEDURE — 47000 NEEDLE BIOPSY OF LIVER PERQ: CPT

## 2022-01-01 PROCEDURE — 85610 PROTHROMBIN TIME: CPT

## 2022-01-01 PROCEDURE — XXXXX: CPT | Mod: 1L

## 2022-01-01 PROCEDURE — A9552: CPT

## 2022-01-01 PROCEDURE — 88305 TISSUE EXAM BY PATHOLOGIST: CPT | Mod: 26

## 2022-01-01 PROCEDURE — 74177 CT ABD & PELVIS W/CONTRAST: CPT | Mod: 26,MA

## 2022-01-01 PROCEDURE — 99203 OFFICE O/P NEW LOW 30 MIN: CPT | Mod: 95

## 2022-01-01 PROCEDURE — 88307 TISSUE EXAM BY PATHOLOGIST: CPT

## 2022-01-01 PROCEDURE — 78815 PET IMAGE W/CT SKULL-THIGH: CPT | Mod: 26,PI

## 2022-01-01 PROCEDURE — 45330 DIAGNOSTIC SIGMOIDOSCOPY: CPT

## 2022-01-01 PROCEDURE — 36415 COLL VENOUS BLD VENIPUNCTURE: CPT

## 2022-01-01 PROCEDURE — 99213 OFFICE O/P EST LOW 20 MIN: CPT | Mod: 25

## 2022-01-01 PROCEDURE — 82248 BILIRUBIN DIRECT: CPT

## 2022-01-01 PROCEDURE — 93971 EXTREMITY STUDY: CPT | Mod: 26,RT

## 2022-01-01 PROCEDURE — 88342 IMHCHEM/IMCYTCHM 1ST ANTB: CPT | Mod: 26,59

## 2022-01-01 PROCEDURE — 80053 COMPREHEN METABOLIC PANEL: CPT

## 2022-01-01 PROCEDURE — 99214 OFFICE O/P EST MOD 30 MIN: CPT | Mod: 25

## 2022-01-01 PROCEDURE — 36247 INS CATH ABD/L-EXT ART 3RD: CPT

## 2022-01-01 PROCEDURE — 37243 VASC EMBOLIZE/OCCLUDE ORGAN: CPT

## 2022-01-01 PROCEDURE — 88173 CYTOPATH EVAL FNA REPORT: CPT

## 2022-01-01 PROCEDURE — 99285 EMERGENCY DEPT VISIT HI MDM: CPT | Mod: 25

## 2022-01-01 PROCEDURE — 87635 SARS-COV-2 COVID-19 AMP PRB: CPT

## 2022-01-01 PROCEDURE — 93971 EXTREMITY STUDY: CPT

## 2022-01-01 PROCEDURE — 99215 OFFICE O/P EST HI 40 MIN: CPT | Mod: 25

## 2022-01-01 PROCEDURE — 82962 GLUCOSE BLOOD TEST: CPT

## 2022-01-01 PROCEDURE — 75726 ARTERY X-RAYS ABDOMEN: CPT | Mod: 26

## 2022-01-01 PROCEDURE — 74183 MRI ABD W/O CNTR FLWD CNTR: CPT

## 2022-01-01 PROCEDURE — 85025 COMPLETE CBC W/AUTO DIFF WBC: CPT

## 2022-01-01 PROCEDURE — 74183 MRI ABD W/O CNTR FLWD CNTR: CPT | Mod: 26

## 2022-01-01 PROCEDURE — C1769: CPT

## 2022-01-01 PROCEDURE — 88173 CYTOPATH EVAL FNA REPORT: CPT | Mod: 26

## 2022-01-01 PROCEDURE — A9540: CPT

## 2022-01-01 PROCEDURE — 85730 THROMBOPLASTIN TIME PARTIAL: CPT

## 2022-01-01 PROCEDURE — 71045 X-RAY EXAM CHEST 1 VIEW: CPT | Mod: 26

## 2022-01-01 PROCEDURE — 99214 OFFICE O/P EST MOD 30 MIN: CPT

## 2022-01-01 PROCEDURE — 88342 IMHCHEM/IMCYTCHM 1ST ANTB: CPT

## 2022-01-01 PROCEDURE — 71045 X-RAY EXAM CHEST 1 VIEW: CPT

## 2022-01-01 PROCEDURE — 88344 IMHCHEM/IMCYTCHM EA MLT ANTB: CPT

## 2022-01-01 PROCEDURE — 88341 IMHCHEM/IMCYTCHM EA ADD ANTB: CPT | Mod: 26,59

## 2022-01-01 PROCEDURE — 93010 ELECTROCARDIOGRAM REPORT: CPT

## 2022-01-01 PROCEDURE — 78815 PET IMAGE W/CT SKULL-THIGH: CPT

## 2022-01-01 PROCEDURE — 88344 IMHCHEM/IMCYTCHM EA MLT ANTB: CPT | Mod: 26,59

## 2022-01-01 PROCEDURE — C9399: CPT

## 2022-01-01 PROCEDURE — 88307 TISSUE EXAM BY PATHOLOGIST: CPT | Mod: 26

## 2022-01-01 PROCEDURE — A9585: CPT

## 2022-01-01 PROCEDURE — C1894: CPT

## 2022-01-01 PROCEDURE — 75774 ARTERY X-RAY EACH VESSEL: CPT

## 2022-01-01 PROCEDURE — 88341 IMHCHEM/IMCYTCHM EA ADD ANTB: CPT

## 2022-01-01 PROCEDURE — 75774 ARTERY X-RAY EACH VESSEL: CPT | Mod: 26

## 2022-01-01 PROCEDURE — 72197 MRI PELVIS W/O & W/DYE: CPT | Mod: 26

## 2022-01-01 PROCEDURE — 99213 OFFICE O/P EST LOW 20 MIN: CPT

## 2022-01-01 PROCEDURE — 76942 ECHO GUIDE FOR BIOPSY: CPT

## 2022-01-01 PROCEDURE — 76942 ECHO GUIDE FOR BIOPSY: CPT | Mod: 26

## 2022-01-01 PROCEDURE — 93005 ELECTROCARDIOGRAM TRACING: CPT

## 2022-01-01 PROCEDURE — 96374 THER/PROPH/DIAG INJ IV PUSH: CPT | Mod: XU

## 2022-01-01 PROCEDURE — 74177 CT ABD & PELVIS W/CONTRAST: CPT | Mod: MA

## 2022-01-01 PROCEDURE — 99205 OFFICE O/P NEW HI 60 MIN: CPT | Mod: 25

## 2022-01-01 PROCEDURE — 88305 TISSUE EXAM BY PATHOLOGIST: CPT

## 2022-01-01 PROCEDURE — C2616: CPT

## 2022-01-01 PROCEDURE — 75726 ARTERY X-RAYS ABDOMEN: CPT

## 2022-01-01 RX ORDER — PIOGLITAZONE HYDROCHLORIDE 15 MG/1
15 TABLET ORAL
Qty: 30 | Refills: 0 | Status: ACTIVE | COMMUNITY
Start: 2022-01-01

## 2022-01-01 RX ORDER — OMEPRAZOLE 10 MG/1
10 CAPSULE, DELAYED RELEASE ORAL DAILY
Qty: 30 | Refills: 0 | Status: DISCONTINUED | COMMUNITY
Start: 2022-01-01 | End: 2022-01-01

## 2022-01-01 RX ORDER — METFORMIN ER 750 MG 750 MG/1
750 TABLET ORAL
Refills: 0 | Status: ACTIVE | COMMUNITY

## 2022-01-01 RX ORDER — ERTUGLIFLOZIN 15 MG/1
15 TABLET, FILM COATED ORAL
Qty: 30 | Refills: 0 | Status: ACTIVE | COMMUNITY
Start: 2022-01-01

## 2022-01-01 RX ORDER — LOSARTAN POTASSIUM 100 MG/1
100 TABLET, FILM COATED ORAL
Qty: 30 | Refills: 0 | Status: ACTIVE | COMMUNITY
Start: 2022-01-01

## 2022-01-01 RX ORDER — DIATRIZOATE MEGLUMINE 180 MG/ML
30 INJECTION, SOLUTION INTRAVESICAL ONCE
Refills: 0 | Status: COMPLETED | OUTPATIENT
Start: 2022-01-01 | End: 2022-01-01

## 2022-01-01 RX ORDER — POLYETHYLENE GLYCOL 3350 17 G/17G
17 POWDER, FOR SOLUTION ORAL
Qty: 1 | Refills: 0 | Status: DISCONTINUED | COMMUNITY
Start: 2021-02-04 | End: 2022-01-01

## 2022-01-01 RX ORDER — TAMSULOSIN HYDROCHLORIDE 0.4 MG/1
0.4 CAPSULE ORAL
Qty: 30 | Refills: 5 | Status: DISCONTINUED | COMMUNITY
Start: 2021-02-10 | End: 2022-01-01

## 2022-01-01 RX ORDER — ERTUGLIFLOZIN 5 MG/1
5 TABLET, FILM COATED ORAL DAILY
Refills: 0 | Status: DISCONTINUED | COMMUNITY
End: 2022-01-01

## 2022-01-01 RX ORDER — SUCCINYLCHOLINE CHLORIDE 100 MG/5ML
150 SYRINGE (ML) INTRAVENOUS ONCE
Refills: 0 | Status: DISCONTINUED | OUTPATIENT
Start: 2022-01-01 | End: 2022-01-01

## 2022-01-01 RX ORDER — MORPHINE SULFATE 50 MG/1
2 CAPSULE, EXTENDED RELEASE ORAL ONCE
Refills: 0 | Status: DISCONTINUED | OUTPATIENT
Start: 2022-01-01 | End: 2022-01-01

## 2022-01-01 RX ORDER — POLYETHYLENE GLYCOL-3350 AND ELECTROLYTES WITH FLAVOR PACK 240; 5.84; 2.98; 6.72; 22.72 G/278.26G; G/278.26G; G/278.26G; G/278.26G; G/278.26G
240 POWDER, FOR SOLUTION ORAL
Qty: 4000 | Refills: 0 | Status: ACTIVE | COMMUNITY
Start: 2022-01-01

## 2022-01-01 RX ADMIN — MORPHINE SULFATE 2 MILLIGRAM(S): 50 CAPSULE, EXTENDED RELEASE ORAL at 13:29

## 2022-01-01 RX ADMIN — DIATRIZOATE MEGLUMINE 30 MILLILITER(S): 180 INJECTION, SOLUTION INTRAVESICAL at 13:29

## 2022-01-01 RX ADMIN — MORPHINE SULFATE 2 MILLIGRAM(S): 50 CAPSULE, EXTENDED RELEASE ORAL at 15:24

## 2022-02-01 ENCOUNTER — APPOINTMENT (OUTPATIENT)
Dept: MRI IMAGING | Facility: CLINIC | Age: 57
End: 2022-02-01
Payer: MEDICAID

## 2022-02-01 ENCOUNTER — OUTPATIENT (OUTPATIENT)
Dept: OUTPATIENT SERVICES | Facility: HOSPITAL | Age: 57
LOS: 1 days | End: 2022-02-01

## 2022-02-01 DIAGNOSIS — Z98.890 OTHER SPECIFIED POSTPROCEDURAL STATES: Chronic | ICD-10-CM

## 2022-02-01 PROCEDURE — 72197 MRI PELVIS W/O & W/DYE: CPT | Mod: 26

## 2022-02-03 ENCOUNTER — NON-APPOINTMENT (OUTPATIENT)
Age: 57
End: 2022-02-03

## 2022-03-02 ENCOUNTER — APPOINTMENT (OUTPATIENT)
Dept: UROLOGY | Facility: CLINIC | Age: 57
End: 2022-03-02

## 2022-03-07 ENCOUNTER — APPOINTMENT (OUTPATIENT)
Dept: COLORECTAL SURGERY | Facility: CLINIC | Age: 57
End: 2022-03-07
Payer: MEDICAID

## 2022-03-07 ENCOUNTER — APPOINTMENT (OUTPATIENT)
Dept: UROLOGY | Facility: CLINIC | Age: 57
End: 2022-03-07
Payer: MEDICAID

## 2022-03-07 VITALS — SYSTOLIC BLOOD PRESSURE: 174 MMHG | HEART RATE: 92 BPM | DIASTOLIC BLOOD PRESSURE: 81 MMHG | TEMPERATURE: 97.1 F

## 2022-03-07 VITALS
HEART RATE: 91 BPM | WEIGHT: 248 LBS | TEMPERATURE: 97.6 F | HEIGHT: 66 IN | SYSTOLIC BLOOD PRESSURE: 143 MMHG | DIASTOLIC BLOOD PRESSURE: 82 MMHG | BODY MASS INDEX: 39.86 KG/M2

## 2022-03-07 LAB — PSA SERPL-MCNC: 0.65 NG/ML

## 2022-03-07 PROCEDURE — 99213 OFFICE O/P EST LOW 20 MIN: CPT

## 2022-03-07 PROCEDURE — 45300 PROCTOSIGMOIDOSCOPY DX: CPT

## 2022-03-07 PROCEDURE — 99214 OFFICE O/P EST MOD 30 MIN: CPT | Mod: 25

## 2022-03-07 NOTE — PHYSICAL EXAM
[Abdomen Masses] : No abdominal masses [Abdomen Tenderness] : ~T No ~M abdominal tenderness [No HSM] : no hepatosplenomegaly [Excoriation] : no perianal excoriation [Skin Tags] : there were no residual hemorrhoidal skin tags seen [Normal] : was normal [None] : there was no rectal mass  [No Rash or Lesion] : No rash or lesion [Alert] : alert [Calm] : calm [FreeTextEntry1] : A medical assistant was present throughout the procedure.\par \par Rigid proctosigmoidoscopy was performed after risks, benefits alternatives were outlined. The procedure was performed for evaluation of rectal bleeding.\par \par A rigid proctoscope was passed through the anus into the rectum to 12    centimeters. The mucosal surface was inspected. The patient tolerated procedure well.\par \par The findings revealed:\par no masses or lesions\par \par Internal hemorrhoids\par \par \par

## 2022-03-07 NOTE — ASSESSMENT
[FreeTextEntry1] : History of T1 NX rectal cancer–status post transanal full-thickness excision.\par \par Remains clinically VIRGILIO.\par \par Labs drawn today.\par \par Recommend colonoscopy with primary GI.  Follow-up 6 months.\par  utilized throughout the visit.

## 2022-03-07 NOTE — HISTORY OF PRESENT ILLNESS
[FreeTextEntry1] : 55 y/o M Lao and English speaking M presents for f/u of cT1-2N0 rectal cancer\par Patient diagnosed on colonoscopy 12/28/20, revealed 15 mm polyp in distal rectum, s/p resection and retrieval. \par \par Initial CT and MRI completed 1/26/21 revealed, solitary nodule within L lobe of prostate noted. No metastatic disease. \par \par s/p transanal full thickness rectal lesion biopsy 2/25/21 revealing no residual disease. \par \par Last seen in the office 11/5/21: No anal fissures, no perianal excoriation, CINTHYA deferred by pt. Anoscopy performed, findings revealed: no masses or lesions. Internal hemorrhoids. Recommendations to continue surveillance and return in 4 months for repeat examination were made including routine lab work, CEA and metabolic panel with his PCP. \par \par Surveillance MRI obtained on 2/1/22 revealing no residual disease.  \par \par BH: Baseline constipated, occasional use of Miralax. He denies any BRBPR, tenesmus, or rectal pain.\par \par

## 2022-03-07 NOTE — ASSESSMENT
[FreeTextEntry1] : 56 year old male with nodule on prostate found after work up for colon cancer\par MRI 4/2021 17mm PI-RADS 5 lesion, high suspicion for cancer\par MRI fusion biopsy 8/2021 negative \par Repeat PSA\par Recommend repeat MRI prostate w/ and w/o contrast\par F/u 6 months pending test results

## 2022-03-07 NOTE — HISTORY OF PRESENT ILLNESS
[FreeTextEntry1] : This is a 55 year old male who presents today as referral from Dr. Rowland after CT finding of solitary nodule within left lobe of the prostate gland.  He is in the process of being treated for rectal adenocarcinoma. \par Recent PSA 1.03 done on 2/4/21\par \par He reports Nocturia x 4-5 for the past 6 months, which he state prior was 0\par No hematuria, no dysuria\par Occasional lower back pain, can be severe at time, resolves without intervention.\par \par He is currently scheduled to undergo transanal excision of the polypectomy on 2/25/21\par \par Social Hx: nonsmoker, rare ETOH use, works in a restaurant\par Family Hx: no CaP, unusure of any other cancer within family\par \par 8/02/21 Here for f/u. MRI showed PI-RADS 5 lesion right anterior horn, 17mm in greatest dimension. Here to discuss MRI fusion biopsy. Recent MRI pelvis for rectal ca shows no evidence of recurrence. \par \par 3/7/22 Here for f/u. Underwent MRI fusion biopsy 8/2021 which was negative. Recent MRI pelvis last month negative for adenopathy but prostate not evaluated. PSA has been around 1. No urinary complaints.  [Urinary Incontinence] : no urinary incontinence [Urinary Retention] : no urinary retention [Urinary Urgency] : no urinary urgency [Urinary Frequency] : no urinary frequency [Nocturia] : nocturia [Straining] : no straining [Weak Stream] : no weak stream [Intermittency] : no intermittency [Post-Void Dribbling] : no post-void dribbling [Dysuria] : no dysuria [Hematuria - Gross] : no gross hematuria [Flank Pain] : no flank pain [Fever] : no fever [Fatigue] : no fatigue [Nausea] : no nausea [Anorexia] : no anorexia [None] : None

## 2022-03-08 LAB
ALBUMIN SERPL ELPH-MCNC: 4.4 G/DL
ALP BLD-CCNC: 179 U/L
ALT SERPL-CCNC: 59 U/L
ANION GAP SERPL CALC-SCNC: 17 MMOL/L
AST SERPL-CCNC: 64 U/L
BASOPHILS # BLD AUTO: 0.07 K/UL
BASOPHILS NFR BLD AUTO: 0.8 %
BILIRUB SERPL-MCNC: 0.2 MG/DL
BUN SERPL-MCNC: 13 MG/DL
CALCIUM SERPL-MCNC: 8.5 MG/DL
CEA SERPL-MCNC: 3.8 NG/ML
CHLORIDE SERPL-SCNC: 104 MMOL/L
CO2 SERPL-SCNC: 17 MMOL/L
CREAT SERPL-MCNC: 0.56 MG/DL
EGFR: 116 ML/MIN/1.73M2
EOSINOPHIL # BLD AUTO: 0.15 K/UL
EOSINOPHIL NFR BLD AUTO: 1.7 %
GLUCOSE SERPL-MCNC: 255 MG/DL
HCT VFR BLD CALC: 47.9 %
HGB BLD-MCNC: 15.8 G/DL
IMM GRANULOCYTES NFR BLD AUTO: 0.6 %
LYMPHOCYTES # BLD AUTO: 2.46 K/UL
LYMPHOCYTES NFR BLD AUTO: 28.5 %
MAN DIFF?: NORMAL
MCHC RBC-ENTMCNC: 30.7 PG
MCHC RBC-ENTMCNC: 33 GM/DL
MCV RBC AUTO: 93.2 FL
MONOCYTES # BLD AUTO: 0.67 K/UL
MONOCYTES NFR BLD AUTO: 7.8 %
NEUTROPHILS # BLD AUTO: 5.24 K/UL
NEUTROPHILS NFR BLD AUTO: 60.6 %
PLATELET # BLD AUTO: 169 K/UL
POTASSIUM SERPL-SCNC: 4.2 MMOL/L
PROT SERPL-MCNC: 7.3 G/DL
RBC # BLD: 5.14 M/UL
RBC # FLD: 12.7 %
SODIUM SERPL-SCNC: 138 MMOL/L
WBC # FLD AUTO: 8.64 K/UL

## 2022-03-10 ENCOUNTER — NON-APPOINTMENT (OUTPATIENT)
Age: 57
End: 2022-03-10

## 2022-03-29 ENCOUNTER — NON-APPOINTMENT (OUTPATIENT)
Age: 57
End: 2022-03-29

## 2022-03-30 ENCOUNTER — RESULT REVIEW (OUTPATIENT)
Age: 57
End: 2022-03-30

## 2022-03-30 ENCOUNTER — OUTPATIENT (OUTPATIENT)
Dept: OUTPATIENT SERVICES | Facility: HOSPITAL | Age: 57
LOS: 1 days | End: 2022-03-30
Payer: MEDICAID

## 2022-03-30 ENCOUNTER — APPOINTMENT (OUTPATIENT)
Dept: ORTHOPEDIC SURGERY | Facility: CLINIC | Age: 57
End: 2022-03-30
Payer: MEDICAID

## 2022-03-30 VITALS
SYSTOLIC BLOOD PRESSURE: 128 MMHG | OXYGEN SATURATION: 96 % | DIASTOLIC BLOOD PRESSURE: 80 MMHG | WEIGHT: 247 LBS | TEMPERATURE: 98.3 F | HEIGHT: 66 IN | BODY MASS INDEX: 39.7 KG/M2 | HEART RATE: 71 BPM

## 2022-03-30 DIAGNOSIS — Z86.79 PERSONAL HISTORY OF OTHER DISEASES OF THE CIRCULATORY SYSTEM: ICD-10-CM

## 2022-03-30 DIAGNOSIS — Z78.9 OTHER SPECIFIED HEALTH STATUS: ICD-10-CM

## 2022-03-30 DIAGNOSIS — Z72.3 LACK OF PHYSICAL EXERCISE: ICD-10-CM

## 2022-03-30 DIAGNOSIS — M72.2 PLANTAR FASCIAL FIBROMATOSIS: ICD-10-CM

## 2022-03-30 DIAGNOSIS — Z86.39 PERSONAL HISTORY OF OTHER ENDOCRINE, NUTRITIONAL AND METABOLIC DISEASE: ICD-10-CM

## 2022-03-30 DIAGNOSIS — M70.61 TROCHANTERIC BURSITIS, RIGHT HIP: ICD-10-CM

## 2022-03-30 DIAGNOSIS — Z85.9 PERSONAL HISTORY OF MALIGNANT NEOPLASM, UNSPECIFIED: ICD-10-CM

## 2022-03-30 DIAGNOSIS — Z98.890 OTHER SPECIFIED POSTPROCEDURAL STATES: Chronic | ICD-10-CM

## 2022-03-30 PROCEDURE — 73521 X-RAY EXAM HIPS BI 2 VIEWS: CPT | Mod: 26

## 2022-03-30 PROCEDURE — 72020 X-RAY EXAM OF SPINE 1 VIEW: CPT | Mod: 26

## 2022-03-30 PROCEDURE — 73564 X-RAY EXAM KNEE 4 OR MORE: CPT | Mod: 26,LT,RT

## 2022-03-30 PROCEDURE — 72020 X-RAY EXAM OF SPINE 1 VIEW: CPT

## 2022-03-30 PROCEDURE — 73564 X-RAY EXAM KNEE 4 OR MORE: CPT

## 2022-03-30 PROCEDURE — 73521 X-RAY EXAM HIPS BI 2 VIEWS: CPT

## 2022-03-30 PROCEDURE — 20610 DRAIN/INJ JOINT/BURSA W/O US: CPT | Mod: RT

## 2022-03-30 PROCEDURE — 99205 OFFICE O/P NEW HI 60 MIN: CPT | Mod: 25

## 2022-03-30 RX ORDER — MELOXICAM 15 MG/1
15 TABLET ORAL DAILY
Qty: 30 | Refills: 2 | Status: ACTIVE | COMMUNITY
Start: 2022-03-30 | End: 1900-01-01

## 2022-03-30 NOTE — REVIEW OF SYSTEMS
[Joint Pain] : joint pain [Joint Stiffness] : joint stiffness [Headache] : headache [Feeling Weak] : feeling weak [Muscle Weakness] : muscle weakness [Negative] : Heme/Lymph

## 2022-03-31 PROBLEM — Z78.9 NON-SMOKER: Status: ACTIVE | Noted: 2022-03-30

## 2022-03-31 PROBLEM — Z86.39 HISTORY OF HIGH CHOLESTEROL: Status: RESOLVED | Noted: 2022-03-30 | Resolved: 2022-03-31

## 2022-03-31 PROBLEM — Z86.39 HISTORY OF DIABETES MELLITUS: Status: RESOLVED | Noted: 2022-03-30 | Resolved: 2022-03-31

## 2022-03-31 PROBLEM — Z86.79 HISTORY OF HYPERTENSION: Status: RESOLVED | Noted: 2022-03-30 | Resolved: 2022-03-31

## 2022-03-31 PROBLEM — Z85.9 HISTORY OF MALIGNANT NEOPLASM: Status: RESOLVED | Noted: 2022-03-30 | Resolved: 2022-03-31

## 2022-03-31 PROBLEM — Z72.3 DOES NOT EXERCISE: Status: ACTIVE | Noted: 2022-03-30
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
## 2022-03-31 NOTE — PHYSICAL EXAM
[de-identified] : General appearance: well nourished and hydrated, pleasant, alert and oriented x 3, cooperative.  Obese habitus.\par HEENT: normocephalic, EOM intact, wearing mask, external auditory canal clear.  \par Cardiovascular: no lower leg edema, no varicosities, dorsalis pedis pulses palpable and symmetric.  \par Lymphatics: no palpable lymphadenopathy, no lymphedema.  \par Neurologic: sensation is normal, no muscle weakness in upper or lower extremities, patella tendon reflexes present and symmetric.  \par Dermatologic: skin moist, warm, no rash.  \par Spine: cervical spine with normal lordosis and painless range of motion, thoracic spine with normal kyphosis and painless range of motion, lumbosacral spine with normal lordosis and stiff but painless range of motion. Nontender at lumbar spine and sacroiliac joints.\par Gait: mild right antalgia.\par \par Left hip: all fields negative/normal/unremarkable unless otherwise noted --\par - Focal soft tissue swelling: \par - Ecchymosis: \par - Erythema: \par - Wounds: \par - Tenderness: none. Diminished sensation to light touch throughout LCFN distribution.\par - ROM: \par   - Flexion: 110\par   - Extension: 0\par   - Adduction: 15\par   - Abduction: 40\par   - Internal rotation in 90 degrees of hip flexion: 15\par   - External rotation in 90 degrees of hip flexion: 60\par - WILL: \par - FADIR: \par - Darrick: \par - Stinchfield: \par - Flexor power: \par - Abductor power: \par \par Right hip: all fields negative/normal/unremarkable unless otherwise noted --\par - Focal soft tissue swelling: \par - Ecchymosis: \par - Erythema: \par - Wounds: \par - Tenderness: marked over lateral trochanter, less over proximal ITB\par - ROM: \par   - Flexion: 110\par   - Extension: 0\par   - Adduction: 15\par   - Abduction: 40\par   - Internal rotation in 90 degrees of hip flexion: 15\par   - External rotation in 90 degrees of hip flexion: 60\par - WILL: painful\par - FADIR: uncomfortable\par - Darrick: \par - Stinchfield: uncomfortable\par - Flexor power: \par - Abductor power:  [de-identified] : A lateral view of the lumbosacral spine, weightbearing AP pelvis, 2 additional views (frog lateral and false profile) of the bilateral hips, and 4 views of the bilateral knees (weightbearing AP, weightbearing Duffy, weightbearing lateral, and Sunrise) were interpreted by me and reviewed with the patient.\par \par Location of imaging: [facility] Date of exam: 3/30/22\par \par Lumbar spine --\par Alignment: normal\par Spondylosis: mild multilevel\par Listhesis: none\par Posterior elements: mild multilevel facet arthrosis\par \par Pelvic alignment: normal\par \par Right hip -- \par Arthritis: mild\par Deformity: pincer\par Osteonecrosis: none\par \par Left hip -- \par Arthritis: mild\par Deformity: pincer\par Osteonecrosis: none\par \par Right knee -- \par Alignment: normal\par Arthritis: mild tricompartmental, KL1\par Patellar height: normal\par Patellar tracking: central\par \par Left knee -- \par Alignment: normal\par Arthritis: mild tricompartmental, KL1\par Patellar height: normal\par Patellar tracking: central

## 2022-03-31 NOTE — DISCUSSION/SUMMARY
[de-identified] : 55y/o male with right trochanteric bursitis, left meralgia paresthetica, bilateral plantar fasciitis\par - CSI right trochanteric bursa today\par - PT\par - HEP\par - Tylenol + meloxicam as needed\par - Refer to [facility] for the PF\par - Follow up with me as needed

## 2022-03-31 NOTE — PROCEDURE
[de-identified] : Procedure: Greater trochanteric bursa injection\par Laterality: right\par Indication: Inflammation/bursitis - discussed with patient\par Skin prep: alcohol and chlorhexidine\par Anesthesia: ethyl chloride spray\par Needle: 22-gauge spinal\par Approach: lateral\par Aspiration: none\par Injectate: 4cc of 2% lidocaine, 4cc of 0.5% bupivacaine, and 1cc of 40mg/mL triamcinolone\par Dressing: Band-aid\par Complications: None\par

## 2022-04-13 ENCOUNTER — RESULT REVIEW (OUTPATIENT)
Age: 57
End: 2022-04-13

## 2022-04-13 ENCOUNTER — APPOINTMENT (OUTPATIENT)
Dept: ORTHOPEDIC SURGERY | Facility: CLINIC | Age: 57
End: 2022-04-13
Payer: MEDICAID

## 2022-04-13 ENCOUNTER — OUTPATIENT (OUTPATIENT)
Dept: OUTPATIENT SERVICES | Facility: HOSPITAL | Age: 57
LOS: 1 days | End: 2022-04-13
Payer: MEDICAID

## 2022-04-13 VITALS — RESPIRATION RATE: 16 BRPM | BODY MASS INDEX: 39.7 KG/M2 | WEIGHT: 247 LBS | HEIGHT: 66 IN

## 2022-04-13 DIAGNOSIS — Z98.890 OTHER SPECIFIED POSTPROCEDURAL STATES: Chronic | ICD-10-CM

## 2022-04-13 DIAGNOSIS — G57.91 UNSPECIFIED MONONEUROPATHY OF RIGHT LOWER LIMB: ICD-10-CM

## 2022-04-13 DIAGNOSIS — M21.6X1 OTHER ACQUIRED DEFORMITIES OF RIGHT FOOT: ICD-10-CM

## 2022-04-13 DIAGNOSIS — M54.50 LOW BACK PAIN, UNSPECIFIED: ICD-10-CM

## 2022-04-13 DIAGNOSIS — M72.2 PLANTAR FASCIAL FIBROMATOSIS: ICD-10-CM

## 2022-04-13 PROCEDURE — 73630 X-RAY EXAM OF FOOT: CPT

## 2022-04-13 PROCEDURE — 99214 OFFICE O/P EST MOD 30 MIN: CPT

## 2022-04-13 PROCEDURE — 73610 X-RAY EXAM OF ANKLE: CPT | Mod: 26,LT,RT

## 2022-04-13 PROCEDURE — 73610 X-RAY EXAM OF ANKLE: CPT

## 2022-04-13 PROCEDURE — 73630 X-RAY EXAM OF FOOT: CPT | Mod: 26,LT,RT

## 2022-04-29 RX ORDER — PREDNISONE 20 MG/1
20 TABLET ORAL
Qty: 10 | Refills: 0 | Status: DISCONTINUED | COMMUNITY
Start: 2021-05-27 | End: 2022-04-29

## 2022-04-29 RX ORDER — OXYCODONE AND ACETAMINOPHEN 5; 325 MG/1; MG/1
5-325 TABLET ORAL EVERY 6 HOURS
Qty: 12 | Refills: 0 | Status: DISCONTINUED | COMMUNITY
Start: 2021-03-01 | End: 2022-04-29

## 2022-04-29 RX ORDER — BISACODYL 5 MG/1
5 TABLET ORAL
Qty: 4 | Refills: 0 | Status: DISCONTINUED | COMMUNITY
Start: 2021-02-04 | End: 2022-04-29

## 2022-04-29 RX ORDER — GABAPENTIN 300 MG
300 TABLET ORAL
Refills: 0 | Status: DISCONTINUED | COMMUNITY
End: 2022-04-29

## 2022-04-29 RX ORDER — TACROLIMUS 1 MG/G
0.1 OINTMENT TOPICAL
Qty: 60 | Refills: 0 | Status: DISCONTINUED | COMMUNITY
Start: 2021-07-15 | End: 2022-04-29

## 2022-04-29 RX ORDER — GABAPENTIN 300 MG/1
300 CAPSULE ORAL
Qty: 30 | Refills: 0 | Status: DISCONTINUED | COMMUNITY
Start: 2021-06-30 | End: 2022-04-29

## 2022-04-29 RX ORDER — BISACODYL 5 MG/1
5 TABLET ORAL
Refills: 0 | Status: DISCONTINUED | COMMUNITY
End: 2022-04-29

## 2022-08-08 NOTE — ASSESSMENT
[FreeTextEntry1] : Clinically without evidence of recurrent disease.  Recommend repeat surveillance MRI.  Labs drawn today.  Follow-up 6 months for CT scan

## 2022-08-08 NOTE — PHYSICAL EXAM
[Abdomen Masses] : No abdominal masses [Abdomen Tenderness] : ~T No ~M abdominal tenderness [No HSM] : no hepatosplenomegaly [Excoriation] : no perianal excoriation [Skin Tags] : there were no residual hemorrhoidal skin tags seen [Normal] : was normal [None] : there was no rectal mass  [No Rash or Lesion] : No rash or lesion [Alert] : alert [Calm] : calm [FreeTextEntry1] : The risks , benefits and alternatives of the procedure were reviewed with the patient. The patient consents to the planned procedure.\par \par The flexible sigmoidoscope was passed through the anus into the rectum. The scope was passed to  35   cm from the anal verge.\par \par The findings revealed:\par \par Scar at proximal to the dentate line.  No evidence of recurrent mass or lesion.

## 2022-08-08 NOTE — HISTORY OF PRESENT ILLNESS
[FreeTextEntry1] : 57 y/o Stateless and English speaking M presents for f/u of cT 1-2 N0 rectal cancer\par Patient was diagnosed on colonoscopy 12/28/20, revealed 15 mm polyp in distal rectum, s/p resection and retrieval. \par \par Initial CT and MRI completed 1/26/21 revealed, solitary nodule within L lobe of prostate noted. No metastatic disease. \par \par s/p transanal full thickness rectal lesion biopsy 2/25/21 revealing no residual disease. \par \par Patient most recently seen 3/7/22: Exam notable for external hemorrhoid, no residual hemorrhoidal skin tags. Rigid proctosigmoidoscopy passed to 12 cm. Internal hemorrhoids appreciated no masses or lesions. H/o T1Nx rectal cancer, clinically VIRGILIO. Advised colonoscopy with primary GI and f/u in 6 mos.  \par \par Surveillance MRI obtained on 2/1/22 revealing no residual disease. \par \par \par Denies rectal pain, bleeding or change in bowel habits.  Appetite energy excellent.

## 2022-09-07 NOTE — END OF VISIT
[FreeTextEntry3] : I, Dr. Velazquez, personally performed the evaluation and management (E/M) services for this established patient who presents today with (a) new problem(s)/exacerbation of (an) existing condition(s).  That E/M includes conducting the examination, assessing all new/exacerbated conditions, and establishing a new plan of care.  Today, our ACP, Janey Hercules, was here to observe the evaluation and management services for this new problem/exacerbated condition to be followed going forward.\par

## 2022-09-07 NOTE — PHYSICAL EXAM
[General Appearance - Well Developed] : well developed [General Appearance - Well Nourished] : well nourished [Normal Appearance] : normal appearance [Well Groomed] : well groomed [General Appearance - In No Acute Distress] : no acute distress [Abdomen Soft] : soft [Abdomen Tenderness] : non-tender [Costovertebral Angle Tenderness] : no ~M costovertebral angle tenderness [Edema] : no peripheral edema [] : no respiratory distress [Respiration, Rhythm And Depth] : normal respiratory rhythm and effort [Exaggerated Use Of Accessory Muscles For Inspiration] : no accessory muscle use [Oriented To Time, Place, And Person] : oriented to person, place, and time [Affect] : the affect was normal [Mood] : the mood was normal [Not Anxious] : not anxious [Normal Station and Gait] : the gait and station were normal for the patient's age [No Palpable Adenopathy] : no palpable adenopathy

## 2022-09-07 NOTE — HISTORY OF PRESENT ILLNESS
[None] : no symptoms [FreeTextEntry1] : This is a 55 year old male who presents today as referral from Dr. Rowland after CT finding of solitary nodule within left lobe of the prostate gland.  He is in the process of being treated for rectal adenocarcinoma. \par Recent PSA 1.03 done on 2/4/21\par \par He reports Nocturia x 4-5 for the past 6 months, which he state prior was 0\par No hematuria, no dysuria\par Occasional lower back pain, can be severe at time, resolves without intervention.\par \par He is currently scheduled to undergo transanal excision of the polypectomy on 2/25/21\par \par Social Hx: nonsmoker, rare ETOH use, works in a restaurant\par Family Hx: no CaP, unusure of any other cancer within family\par \par 8/02/21 Here for f/u. MRI showed PI-RADS 5 lesion right anterior horn, 17mm in greatest dimension. Here to discuss MRI fusion biopsy. Recent MRI pelvis for rectal ca shows no evidence of recurrence. \par \par 3/7/22 Here for f/u. Underwent MRI fusion biopsy 8/2021 which was negative. Recent MRI pelvis last month negative for adenopathy but prostate not evaluated. PSA has been around 1. No urinary complaints. \par \par 9/7/22 Here for follow. No urological complaints. Last PSA 0.65 - 3/2022. No recent prostate imaging. He is currently being evaluated for possible colon cancer recurrence.  He had a recent PET scan suspicious for liver mets.

## 2022-09-07 NOTE — ASSESSMENT
[FreeTextEntry1] : 56 year old male with nodule on prostate found after work up for colon cancer. \par MRI 4/2021 17mm PI-RADS 5 lesion, high suspicion for cancer\par MRI fusion biopsy 8/2021 negative \par Was recommend repeat MRI prostate 3/2022 which was not done. Currently being evaluated for metastatic colon cancer, we will hold off on further prostate imaging for now. \par check PSA today.\par F/u 6 months

## 2022-09-15 PROBLEM — G89.18 POST-OP PAIN: Status: RESOLVED | Noted: 2021-09-01 | Resolved: 2022-01-01

## 2022-09-15 PROBLEM — F10.10 ALCOHOL ABUSE: Status: ACTIVE | Noted: 2022-01-01

## 2022-09-15 PROBLEM — R97.0 ELEVATED CARCINOEMBRYONIC ANTIGEN (CEA): Status: ACTIVE | Noted: 2022-01-01

## 2022-09-15 PROBLEM — F41.9 ANXIETY IN CANCER PATIENT: Status: ACTIVE | Noted: 2022-01-01

## 2022-09-15 PROBLEM — K74.60 CIRRHOSIS: Status: ACTIVE | Noted: 2022-01-01

## 2022-09-15 NOTE — PHYSICAL EXAM
[Ambulatory and capable of all self care but unable to carry out any work activities] : Status 2- Ambulatory and capable of all self care but unable to carry out any work activities. Up and about more than 50% of waking hours [Normal] : grossly intact [Obese] : obese [de-identified] : anxious, tearful [de-identified] : Mild L flank pain.  obesity limits evaluation for organomegaly.  not distended. [de-identified] : anxious

## 2022-09-15 NOTE — REVIEW OF SYSTEMS
[Cough] : cough [SOB on Exertion] : shortness of breath during exertion [Abdominal Pain] : abdominal pain [Dizziness] : dizziness [Negative] : Allergic/Immunologic [Shortness Of Breath] : no shortness of breath [Vomiting] : no vomiting [Constipation] : no constipation [Diarrhea] : no diarrhea [Confused] : no confusion [Fainting] : no fainting [Insomnia] : insomnia [Anxiety] : anxiety

## 2022-09-15 NOTE — ASSESSMENT
[FreeTextEntry1] : 57 yo M with essential hypertension, hyperlipidemia, cirrhosis, cT1-2 N0 rectal adenocarcinoma (diagnosed 12/28/20) s/p transanal full thickness rectal lesion biopsy (02/25/21) with no residual disease with negative surveillance (MRI 02/01/22) and negative rigid proctosigmoidoscopy (03/07/22), solitary prostatic nodule within left lobe following Dr. Velazquez (Urology) identified as PIRADS 5 on MRI (04/2021) with negative MRI fusion biopsy (08/31/21) and with stable PSA, repeat prostate MRI on hold given evaluation for metastatic rectal cancer. CEA Trend: 2.3 (01/08/21) --> 3.8 (03/07/22) --> 17.0 (08/08/22). In light of rising CEA, PET-CT was performed showing possible disease recurrence and metastasis in the liver. Scheduled colonoscopy with Dr. Rowland on 09/23/22\par \par Patient refused official translation services; wanted daughter to help translate. \par \par #) Rectal adenocarcinoma \par - Informed family of the PET/CT findings which in addition to rising CEA is concerning for recurrence. Will however need to verify metastatic disease with a liver biopsy and further evaluate the liver for other metastatic lesions. Will remeasure CEA to assess the trend of CEA increase.  \par - MRI Liver\par - IR consult for liver biopsy \par - Hepatitis panel, CEA, AFP, PT/PTT, CBC and CMP\par -  introduced to the patient. \par - Will present case to multidisciplinary tumor board for approach depending on solitary liver lesion vs oligometastases
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
## 2022-09-15 NOTE — END OF VISIT
[] : Fellow [FreeTextEntry3] : \par I saw and evaluated the patient with the Hematology/Oncology fellow Dr Nirmal Harden.  Briefly, this is a 56 year old man who was diagnosed with a well differentiated rectal adenocarcinoma in late 2020.  Clinical stage was T1N0M0.  The tumor was removed with polypectomy;  a subsequent transanal full thickness biopsy on 2/25/21 by Dr Rowland showed no evidence of residual cancer.  \par \par The patient has been undergoing surveillance.  Sigmoidoscopy on 8/8/22 did not raise any concerns for local recurrence.  However routine labs showed a significant rise in the CEA, up to 17.  Labs also showed low platelet count (122), elevated LFTS (, ).  PET scan showed focal uptake in the proximal anal canal suspicious for local recurrence as well as a 3.8 cm mass in the liver that was FDG avid and concerning for malignancy, in the background of cirrhosis.\par \par The patient presents w/ his family to discuss next steps.  He endorses anxiety related to his cancer.  He has a hx of alcohol abuse but stopped drinking a few weeks ago and has successfully avoided using this as a coping mechanism thus far.\par \par We settled on the following plan:\par 1.  Refer to IR for liver biopsy - concern for metastatic rectal ca to liver but other entities possible.  Will also need tissue for biomarker testing and NGS.\par 2.  Obtain liver MRI - to better characterize if this is a solitary lesion or if other suspicious liver lesions are present.\par 3.  repeat labs today - incl CBC, CMP, CEA, coags (in preparation for biopsy) and AFP.  will also check hepatitis serologies in light of cirrhotic-appearing liver and elevated transaminases.\par 4.  Pt to follow up in clinic after the MRI and after the biopsy.  If he has limited disease in the liver - will present at tumor board for opinion from surgical oncology\par 5.  Asked oncology social worker to meet w/ pt regarding anxiety, distress about cancer recurrence.  Commended him for quitting ETOH and encouraged him to remain sober.

## 2022-09-29 NOTE — PHYSICAL EXAM
[Obese] : obese [Restricted in physically strenuous activity but ambulatory and able to carry out work of a light or sedentary nature] : Status 1- Restricted in physically strenuous activity but ambulatory and able to carry out work of a light or sedentary nature, e.g., light house work, office work [Normal] : affect appropriate [de-identified] : supple [de-identified] : no respiratory distress [de-identified] : normal HR [de-identified] : no edema [de-identified] : not distended. [de-identified] : AAO x 3.  grossly intact.
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
## 2022-09-29 NOTE — ASSESSMENT
[FreeTextEntry1] : Dasia rPather is a 56 year old man who was diagnosed with a well differentiated rectal adenocarcinoma in late 2020. Clinical stage was T1N0M0 (stage I). The tumor was removed with polypectomy; a subsequent transanal full thickness biopsy on 2/25/21 by Dr Rowland showed no evidence of residual cancer. \par \par The patient has been undergoing surveillance. Sigmoidoscopy on 8/8/22 did not raise any concerns for local recurrence. However routine labs showed a significant rise in the CEA, up to 17. Labs also showed low platelet count (122), elevated LFTS (, ). PET scan showed focal uptake in the proximal anal canal suspicious for local recurrence as well as a 3.8 cm mass in the liver that was FDG avid and concerning for malignancy, in the background of cirrhosis.  MRI of the liver did not reveal any additional lesions.\par \par Biopsy of the liver mass is still preliminary, but I have discussed the case with the Pathologist.  The report shows an adenocarcinoma that is CK7 and CK20 positive, with weak CDX2 expression.  This IHC profile is consistent with origin from the pancreas, biliary tract, stomach, or urothelium.  Per the pathologist, the morphology and IHC profile is not typical of a rectal cancer metastasis.\par \par Plan:\par 1.  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  Liver metastasis still in the differential; hopefully the pending immunostains will clarify.  \par --will seek input from GI tumor board for pathology review and treatment recommendations\par --CA 19-9 pending on labs today\par --per discussion at last tumor board and with surgical oncology - size, location of the tumor, and underlying cirrhotic morphology of the liver suggests that surgical resection may not be possible.  Tumor size may be too large for ablation. If this is deemed to be localized, unresectable intrahepatic cholangiocarcinoma - then I favor a course of initial chemotherapy (with gemcitabine, cisplatin, durvalumab) followed by repeat imaging after a 2-3 month interval - and then either resection or ablation of the residual lesion.  \par --discussed risks, side effects of chemotherapy with the aforementioned regimen w/ the patient and his family.  NSG also provided chemo education today\par --will wait to initiate chemo until path is final and tumor board review\par --will need to sign treatment consent, and to prescribe supportive care meds (not done yet)\par --tumor is also being sent to [facility] for NGS which may also be helpful in differentiating site of origin (for example if IDH1 mutation present, would more strongly point to biliary tract origin)\par \par \par 2.  Cirrhosis, elevated AST/ALT\par --hepatitis B, C serologies negative\par --encouraged him to continue to abstain from alcohol.\par \par Follow up - 10/11/22 after tumor board, potentially for 1st chemo

## 2022-09-29 NOTE — REVIEW OF SYSTEMS
[Cough] : cough [SOB on Exertion] : shortness of breath during exertion [Abdominal Pain] : abdominal pain [Dizziness] : dizziness [Insomnia] : insomnia [Anxiety] : anxiety [Negative] : Allergic/Immunologic [Shortness Of Breath] : no shortness of breath [Vomiting] : no vomiting [Constipation] : no constipation [Diarrhea] : no diarrhea [Confused] : no confusion [Fainting] : no fainting

## 2022-09-29 NOTE — REASON FOR VISIT
[Follow-Up Visit] : a follow-up [Interpreters_FullName] : Raine [Interpreters_Relationshiptopatient] : Language Line [TWNoteComboBox1] : [place]ian

## 2022-10-11 PROBLEM — R12 HEART BURN: Status: ACTIVE | Noted: 2022-01-01

## 2022-10-11 NOTE — REVIEW OF SYSTEMS
[Abdominal Pain] : abdominal pain [Insomnia] : insomnia [Anxiety] : anxiety [Negative] : Allergic/Immunologic [Shortness Of Breath] : no shortness of breath [Cough] : no cough [SOB on Exertion] : no shortness of breath during exertion [Vomiting] : no vomiting [Constipation] : no constipation [Diarrhea] : no diarrhea [Confused] : no confusion [Dizziness] : no dizziness [Fainting] : no fainting [Difficulty Walking] : no difficulty walking [FreeTextEntry7] : d

## 2022-10-11 NOTE — PHYSICAL EXAM
[Restricted in physically strenuous activity but ambulatory and able to carry out work of a light or sedentary nature] : Status 1- Restricted in physically strenuous activity but ambulatory and able to carry out work of a light or sedentary nature, e.g., light house work, office work [Obese] : obese [Normal] : affect appropriate [de-identified] : supple [de-identified] : no respiratory distress [de-identified] : normal HR [de-identified] : no edema [de-identified] : mild tenderness on RUQ.  biopsy site is well healed, no signs of infection or bleeding/bruising. [de-identified] : AAO x 3.  grossly intact.

## 2022-10-11 NOTE — REASON FOR VISIT
[Follow-Up Visit] : a follow-up [Interpreters_IDNumber] : [id number] [Interpreters_FullName] : Benjamin [Interpreters_Relationshiptopatient] : Language Line [TWNoteComboBox1] : Portuguese
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
## 2022-10-11 NOTE — ASSESSMENT
[FreeTextEntry1] : Dasia Prather is a 56 year old man who was diagnosed with a well differentiated rectal adenocarcinoma in late 2020. Clinical stage was T1N0M0 (stage I). The tumor was removed with polypectomy; a subsequent transanal full thickness biopsy on 2/25/21 by Dr Rowland showed no evidence of residual cancer. \par \par The patient has been undergoing surveillance. Sigmoidoscopy on 8/8/22 did not raise any concerns for local recurrence. However routine labs showed a significant rise in the CEA, up to 17. Labs also showed low platelet count (122), elevated LFTS (, ). PET scan showed focal uptake in the proximal anal canal suspicious for local recurrence as well as a 3.8 cm mass in the liver that was FDG avid and concerning for malignancy, in the background of cirrhosis.  MRI of the liver did not reveal any additional lesions.\par \par Biopsy of the liver mass showed an adenocarcinoma that is CK7 and CK20 positive, with weak CDX2 expression. This IHC profile was felt to be most consistent with origin from the pancreas, biliary tract, stomach, or urothelium. Per the pathologist, the morphology and IHC profile is not typical of a rectal cancer metastasis.  On review in tumor board of imaging and pathology, the diagnosis of the liver lesion was felt to be most consistent with a primary intrahepatic cholangiocarcinoma.  [id number] (stage I)\par \par Plan:\par 1.  intrahepatic cholangiocarcinoma\par --discussed final pathology results and information from tumor board review with patient and his family\par --tumor is felt to be unresectable based on central location in the setting of underlying liver disease/cirrhotic liver morphology.  \par --at last visit we discussed the possibility of chemotherapy. Radioembolization with Y90 also discussed at tumor board as an alternative and was the course of action recommended at the multidisciplinary conference.   The patient and his family were in agreement.  will cancel plans for chemotherapy and instead refer to IR for Y90. \par --tumor is also being sent to [facility] for NGS which may also be helpful in confirming site of origin (for example if IDH1 mutation present, would more strongly point to biliary tract origin)\par \par 2.  Cirrhosis, elevated AST/ALT\par --hepatitis B, C serologies negative\par --encouraged him to continue to abstain from alcohol.\par \par 3.  abdominal pain\par --advised him to reduce use of NSAIDs.\par --continue PPI\par --trial gas-x\par --since pain started after the biopsy - advised him to d/w IR team.\par \par Follow up in 4 weeks\par labs with CBC, CMP, CA 19-9

## 2022-10-14 NOTE — CONSULT LETTER
[Dear  ___] : Dear  [unfilled], [Consult Letter:] : I had the pleasure of evaluating your patient, [unfilled]. [Please see my note below.] : Please see my note below. [Consult Closing:] : Thank you very much for allowing me to participate in the care of this patient.  If you have any questions, please do not hesitate to contact me. [Sincerely,] : Sincerely, [FreeTextEntry3] : Oli Pool MD, FSIR\par Chief Interventional Radiology\par [facility]\par [facility]\par  [DrMirella  ___] : Dr. MORAN

## 2022-10-14 NOTE — HISTORY OF PRESENT ILLNESS
[Home] : at home, [unfilled] , at the time of the visit. [Medical Office: ([facility])___] : at the medical office located in  [Family Member] : family member [Verbal consent obtained from patient] : the patient, [unfilled] [FreeTextEntry1] : cholangiocarcinoma, anal cancer [FreeTextEntry3] : Tressa Blount [FreeTextEntry2] : none [de-identified] : liver mass 9/2022

## 2022-10-14 NOTE — ASSESSMENT
[FreeTextEntry1] : 56 year old with intra hepatic cholangiocarcinoma.  Patient is a candidate for y90 radioembolization which has been scheduled.  He will need clearance for anesthesia.  Authorization pending.

## 2022-10-27 NOTE — ED PROVIDER NOTE - PATIENT PORTAL LINK FT
You can access the FollowMyHealth Patient Portal offered by [facility] by registering at the following website: [email or web address]. By joining [facility]’s FollowMyHealth portal, you will also be able to view your health information using other applications (apps) compatible with our system.

## 2022-10-27 NOTE — ED PROVIDER NOTE - CLINICAL SUMMARY MEDICAL DECISION MAKING FREE TEXT BOX
56M with a PMHx of cholangiocarcinoma who p/w stomach pain and RLE edema after he had an angiogram with IR 2 days ago. Pt had taken motrin and meloxicam at home with minimal relief. Associated with constipation as well. Sent in by Dr. Pool's nurse for eval given new abd pain and RLE swelling. Pt denies groin pain, swelling or ecchymosis, no n/v, no diarrhea, no f/c, no dizziness or syncope. No other complaints at this time.  Pt is well-appearing on exam, but does have mild abd ttp and RLE edema noted, VSS, no focal neuro deficits, EKG NSR, no ischemia.   D/w Dr. Pool and will w/u with labs, CT a/o and RLE US.   IV morphine for pain (pt cannot take tylenol).   Dispo pending workup.

## 2022-10-27 NOTE — ED PROVIDER NOTE - PROGRESS NOTE DETAILS
US neg for DVT, Ct shows constipation.   Pt seen by Dr. Pool, feels much better.   Recs for constipation given.     Pt feeling improved and is stable for DC. ED evaluation and management discussed with the patient in detail.  Close PMD follow up encouraged.  Strict ED return instructions discussed in detail and patient given the opportunity to ask any questions about their discharge diagnosis and instructions. Patient verbalized understanding.

## 2022-10-27 NOTE — ED ADULT NURSE NOTE - OBJECTIVE STATEMENT
patient presents to ED with upper abdominal pain, denies nausea, vomiting, diarrhea, fever, cough. patient states he got mapping procedure this Tuesday d/t CBD cancer. stable at bed.

## 2022-10-27 NOTE — ED PROVIDER NOTE - CARE PROVIDER_API CALL
Oli Pool (MD)  Diagnostic Radiology; [facility]  [address] [place] [address]  Phone: [phone]  Fax: [phone]  Follow Up Time:

## 2022-10-27 NOTE — ED PROVIDER NOTE - PHYSICAL EXAMINATION
GEN: Well appearing, well developed, awake, alert, oriented to person, place, time/situation and in no apparent distress. NTAF  ENT: Airway patent, Nasal mucosa clear. Mouth with normal mucosa.  EYES: Clear bilaterally. PERRL, EOMI  RESPIRATORY: Breathing comfortably with normal RR. No W/C/R, no hypoxia or resp distress.  CARDIAC: Regular rate and rhythm, no M/R/G  ABDOMEN: Soft, mild TTP diffusely, +bowel sounds, no rebound, rigidity, or guarding.  MSK: Range of motion is not limited, no deformities noted. +right ankle and foot edema notes. No swelling or mass in right groin at site of recent angiogram.   NEURO: Alert and oriented, no focal deficits.  SKIN: Skin normal color for race, warm, dry and intact. No evidence of rash.  PSYCH: Alert and oriented to person, place, time/situation. normal mood and affect. no apparent risk to self or others.

## 2022-10-27 NOTE — ED PROVIDER NOTE - OBJECTIVE STATEMENT
56M with a PMHx of cholangiocarcinoma who p/w stomach pain and RLE edema after he had an angiogram with IR 2 days ago. Pt had taken motrin and meloxicam at home with minimal relief. Associated with constipation as well. Sent in by Dr. Pool's nurse for eval given new abd pain and RLE swelling. Pt denies groin pain, swelling or ecchymosis, no n/v, no diarrhea, no f/c, no dizziness or syncope. No other complaints at this time. (1) body pink, extremities blue

## 2022-10-27 NOTE — ED ADULT TRIAGE NOTE - CHIEF COMPLAINT QUOTE
Pt presents to the ED c/o abdominal pain that began yesterday associated w/ constipation and swelling to feet. Pt w/ hx of bile duct cancer. Pt had mapping done w/ Dr. Mendez yesterday to check the arteries for chemo. Denies fever, chills, NVD, urinary sx, CP, SOB.

## 2022-10-27 NOTE — ED ADULT NURSE NOTE - NSFALLRSKHARMRISK_ED_ALL_ED
no
Pt states she has troubling remembering the information she was told. She states she needs her daughter with her during these times

## 2022-10-27 NOTE — ED PROVIDER NOTE - NSFOLLOWUPINSTRUCTIONS_ED_ALL_ED_FT
Please follow up with Dr. Pool and your cancer doctors as instructed.     Constipation is when a person has fewer than three bowel movements a week, has difficulty having a bowel movement, or has stools that are dry, hard, or larger than normal. Other symptoms can include abdominal pain or bloating. As people grow older, constipation is more common. A low-fiber diet, not taking in enough fluids, and taking certain medicines, including opioid painkillers, may make constipation worse. Treatment varies but may include dietary modifications (more fiber-rich foods), lifestyle modifications, and possible medications.     Try the following medications as a "bowel regimen:"  Take one tab of colace twice daily for 5 days.  Take one capful of miralax nightly for 5 days.  Drink plenty of water.    SEEK IMMEDIATE MEDICAL CARE IF YOU HAVE ANY OF THE FOLLOWING SYMPTOMS: bright red blood in your stool, constipation for longer than 4 days, abdominal or rectal pain, unexplained weight loss, or inability to pass gas.

## 2022-10-28 NOTE — PRE-ANESTHESIA EVALUATION ADULT - NSANTHADDINFOFT_GEN_ALL_CORE
General anesthesia discussed with patient and relative through .  All questions answered.  Safety emphasized.

## 2022-11-07 PROBLEM — R97.8 ABNORMAL TUMOR MARKERS: Status: ACTIVE | Noted: 2022-01-01

## 2022-11-08 PROBLEM — R74.01 TRANSAMINITIS: Status: ACTIVE | Noted: 2022-01-01
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
## 2022-11-08 NOTE — PHYSICAL EXAM
[Restricted in physically strenuous activity but ambulatory and able to carry out work of a light or sedentary nature] : Status 1- Restricted in physically strenuous activity but ambulatory and able to carry out work of a light or sedentary nature, e.g., light house work, office work [Obese] : obese [Normal] : grossly intact [de-identified] : soft. nondistended.  reports pain w/ deep palpation b/l lower abdominal quadrants.  no masses.  obesity limits evaluation for organomegaly

## 2022-11-08 NOTE — REVIEW OF SYSTEMS
[Abdominal Pain] : abdominal pain [Shortness Of Breath] : no shortness of breath [Cough] : no cough [SOB on Exertion] : no shortness of breath during exertion [Vomiting] : no vomiting [Constipation] : constipation [Diarrhea] : no diarrhea [Negative] : Psychiatric

## 2022-11-08 NOTE — REASON FOR VISIT
[Follow-Up Visit] : a follow-up [Interpreters_IDNumber] : [id number] [Interpreters_FullName] : Karis [Interpreters_Relationshiptopatient] : Language Line [TWNoteComboBox1] : Faroese

## 2022-11-08 NOTE — ASSESSMENT
[FreeTextEntry1] : Dasia Prather is a 56 year old man who was diagnosed with a well differentiated rectal adenocarcinoma in late 2020. Clinical stage was T1N0M0 (stage I). The tumor was removed with polypectomy; a subsequent transanal full thickness biopsy on 2/25/21 by Dr Rowland showed no evidence of residual cancer. \par \par The patient has been undergoing surveillance. Sigmoidoscopy on 8/8/22 did not raise any concerns for local recurrence. However routine labs showed a significant rise in the CEA, up to 17. Labs also showed mildly elevated AST/ALT.   PET scan showed focal uptake in the proximal anal canal suspicious for local recurrence as well as a 3.8 cm mass in the liver that was FDG avid and concerning for malignancy, in the background of cirrhosis.  MRI of the liver did not reveal any additional lesions.\par \par Biopsy of the liver mass showed an adenocarcinoma that is CK7 and CK20 positive, with weak CDX2 expression. This IHC profile was felt to be most consistent with origin from the pancreas, biliary tract, stomach, or urothelium. Per the pathologist, the morphology and IHC profile is not typical of a rectal cancer metastasis.  On review in tumor board of imaging and pathology, the diagnosis of the liver lesion was felt to be most consistent with a primary intrahepatic cholangiocarcinoma.  [id number] (stage I).  baseline CA 19-9 = 216.  Unresectable;  s/p y-90 radioembolization 10/28/22.\par \par Plan:\par 1.  intrahepatic cholangiocarcinoma\par --tolerated Y-90 radioembolization quite well.\par --has f/u with Dr Rodrigez next week.  Pt says he is scheduled for liver MRI 12/28;  I don't see this appt on the calendar but timeframe would be reasonable, 2-3 months following Y-90\par --await MRI results.  If disease is controlled/responding to Y-90, will reserve chemotherapy for time of progression.\par --will need periodic imaging to evaluate for extra-hepatic disease (chest CT q6months reasonable, 3/2023)\par \par 2.  Cirrhosis, elevated AST/ALT\par --hepatitis B, C serologies negative\par --encouraged him to continue to abstain from alcohol.\par --down from last check\par \par 3.  abdominal pain\par --improving.  ? constipation related?  good bowel regimen advised\par \par 4.  rectal cancer\par --s/p colonoscopy 10/18/22.  Per report, may have had poor prep.  but no lesions or polyps seen.  5-year follow up per report.  has follow up with Dr Rowland 2/2023\par \par Follow up in early Jan (after MRI)\par labs with CBC, CMP, CA 19-9

## 2022-11-17 NOTE — ED PROVIDER NOTE - NS ED MD DISPO DISCHARGE CCDA
Recommend OBSERVATION and continued MONITORING for progression. Patient/Caregiver provided printed discharge information.

## 2023-01-01 ENCOUNTER — APPOINTMENT (OUTPATIENT)
Age: 58
End: 2023-01-01

## 2023-01-01 ENCOUNTER — APPOINTMENT (OUTPATIENT)
Dept: INTERVENTIONAL RADIOLOGY/VASCULAR | Facility: HOSPITAL | Age: 58
End: 2023-01-01

## 2023-01-01 ENCOUNTER — LABORATORY RESULT (OUTPATIENT)
Age: 58
End: 2023-01-01

## 2023-01-01 ENCOUNTER — APPOINTMENT (OUTPATIENT)
Dept: CT IMAGING | Facility: HOSPITAL | Age: 58
End: 2023-01-01

## 2023-01-01 ENCOUNTER — APPOINTMENT (OUTPATIENT)
Dept: INTERVENTIONAL RADIOLOGY/VASCULAR | Facility: HOSPITAL | Age: 58
End: 2023-01-01
Payer: MEDICAID

## 2023-01-01 ENCOUNTER — APPOINTMENT (OUTPATIENT)
Dept: CT IMAGING | Facility: CLINIC | Age: 58
End: 2023-01-01
Payer: MEDICAID

## 2023-01-01 ENCOUNTER — APPOINTMENT (OUTPATIENT)
Dept: HEMATOLOGY ONCOLOGY | Facility: CLINIC | Age: 58
End: 2023-01-01

## 2023-01-01 ENCOUNTER — APPOINTMENT (OUTPATIENT)
Dept: INFUSION THERAPY | Facility: CLINIC | Age: 58
End: 2023-01-01

## 2023-01-01 ENCOUNTER — RESULT REVIEW (OUTPATIENT)
Age: 58
End: 2023-01-01

## 2023-01-01 ENCOUNTER — OUTPATIENT (OUTPATIENT)
Dept: OUTPATIENT SERVICES | Facility: HOSPITAL | Age: 58
LOS: 1 days | End: 2023-01-01
Payer: MEDICAID

## 2023-01-01 ENCOUNTER — APPOINTMENT (OUTPATIENT)
Dept: UROLOGY | Facility: CLINIC | Age: 58
End: 2023-01-01

## 2023-01-01 ENCOUNTER — APPOINTMENT (OUTPATIENT)
Dept: MRI IMAGING | Facility: HOSPITAL | Age: 58
End: 2023-01-01

## 2023-01-01 ENCOUNTER — OUTPATIENT (OUTPATIENT)
Dept: OUTPATIENT SERVICES | Facility: HOSPITAL | Age: 58
LOS: 1 days | End: 2023-01-01

## 2023-01-01 ENCOUNTER — APPOINTMENT (OUTPATIENT)
Dept: COLORECTAL SURGERY | Facility: CLINIC | Age: 58
End: 2023-01-01
Payer: MEDICAID

## 2023-01-01 ENCOUNTER — NON-APPOINTMENT (OUTPATIENT)
Age: 58
End: 2023-01-01

## 2023-01-01 ENCOUNTER — APPOINTMENT (OUTPATIENT)
Dept: COLORECTAL SURGERY | Facility: CLINIC | Age: 58
End: 2023-01-01

## 2023-01-01 ENCOUNTER — APPOINTMENT (OUTPATIENT)
Dept: UROLOGY | Facility: CLINIC | Age: 58
End: 2023-01-01
Payer: MEDICAID

## 2023-01-01 ENCOUNTER — APPOINTMENT (OUTPATIENT)
Dept: HEMATOLOGY ONCOLOGY | Facility: CLINIC | Age: 58
End: 2023-01-01
Payer: MEDICAID

## 2023-01-01 VITALS
DIASTOLIC BLOOD PRESSURE: 82 MMHG | TEMPERATURE: 97 F | HEART RATE: 84 BPM | HEIGHT: 66 IN | SYSTOLIC BLOOD PRESSURE: 130 MMHG | BODY MASS INDEX: 39.53 KG/M2 | WEIGHT: 246 LBS

## 2023-01-01 VITALS
DIASTOLIC BLOOD PRESSURE: 78 MMHG | SYSTOLIC BLOOD PRESSURE: 137 MMHG | HEART RATE: 73 BPM | HEIGHT: 66 IN | TEMPERATURE: 97 F | BODY MASS INDEX: 39.7 KG/M2 | RESPIRATION RATE: 18 BRPM | OXYGEN SATURATION: 96 % | WEIGHT: 247 LBS

## 2023-01-01 VITALS — DIASTOLIC BLOOD PRESSURE: 77 MMHG | HEART RATE: 73 BPM | SYSTOLIC BLOOD PRESSURE: 138 MMHG | TEMPERATURE: 98.4 F

## 2023-01-01 VITALS
SYSTOLIC BLOOD PRESSURE: 131 MMHG | DIASTOLIC BLOOD PRESSURE: 81 MMHG | RESPIRATION RATE: 18 BRPM | TEMPERATURE: 98.3 F | OXYGEN SATURATION: 95 % | HEART RATE: 76 BPM | BODY MASS INDEX: 40.5 KG/M2 | WEIGHT: 252 LBS | HEIGHT: 66 IN

## 2023-01-01 DIAGNOSIS — Z98.890 OTHER SPECIFIED POSTPROCEDURAL STATES: Chronic | ICD-10-CM

## 2023-01-01 DIAGNOSIS — R68.89 OTHER GENERAL SYMPTOMS AND SIGNS: ICD-10-CM

## 2023-01-01 DIAGNOSIS — R91.8 OTHER NONSPECIFIC ABNORMAL FINDING OF LUNG FIELD: ICD-10-CM

## 2023-01-01 DIAGNOSIS — R10.9 UNSPECIFIED ABDOMINAL PAIN: ICD-10-CM

## 2023-01-01 DIAGNOSIS — C78.00 SECONDARY MALIGNANT NEOPLASM OF UNSPECIFIED LUNG: ICD-10-CM

## 2023-01-01 DIAGNOSIS — R59.0 LOCALIZED ENLARGED LYMPH NODES: ICD-10-CM

## 2023-01-01 DIAGNOSIS — Z98.890 OTHER SPECIFIED POSTPROCEDURAL STATES: ICD-10-CM

## 2023-01-01 DIAGNOSIS — K76.9 LIVER DISEASE, UNSPECIFIED: ICD-10-CM

## 2023-01-01 DIAGNOSIS — R11.2 NAUSEA WITH VOMITING, UNSPECIFIED: ICD-10-CM

## 2023-01-01 DIAGNOSIS — G89.3 NEOPLASM RELATED PAIN (ACUTE) (CHRONIC): ICD-10-CM

## 2023-01-01 DIAGNOSIS — D69.6 THROMBOCYTOPENIA, UNSPECIFIED: ICD-10-CM

## 2023-01-01 DIAGNOSIS — C20 MALIGNANT NEOPLASM OF RECTUM: ICD-10-CM

## 2023-01-01 DIAGNOSIS — T45.1X5A NAUSEA WITH VOMITING, UNSPECIFIED: ICD-10-CM

## 2023-01-01 DIAGNOSIS — I89.8 OTHER SPECIFIED NONINFECTIVE DISORDERS OF LYMPHATIC VESSELS AND LYMPH NODES: ICD-10-CM

## 2023-01-01 DIAGNOSIS — K83.8 OTHER SPECIFIED DISEASES OF BILIARY TRACT: ICD-10-CM

## 2023-01-01 DIAGNOSIS — C22.1 INTRAHEPATIC BILE DUCT CARCINOMA: ICD-10-CM

## 2023-01-01 DIAGNOSIS — K74.60 UNSPECIFIED CIRRHOSIS OF LIVER: ICD-10-CM

## 2023-01-01 LAB
ALBUMIN SERPL ELPH-MCNC: 3.1 G/DL
ALBUMIN SERPL ELPH-MCNC: 3.7 G/DL
ALP BLD-CCNC: 180 U/L
ALP BLD-CCNC: 208 U/L
ALT SERPL-CCNC: 45 U/L
ALT SERPL-CCNC: 48 U/L
ANION GAP SERPL CALC-SCNC: 10 MMOL/L
ANION GAP SERPL CALC-SCNC: 13 MMOL/L
APTT BLD: 33.6 SEC
AST SERPL-CCNC: 58 U/L
AST SERPL-CCNC: 66 U/L
BILIRUB SERPL-MCNC: 0.9 MG/DL
BILIRUB SERPL-MCNC: 1.2 MG/DL
BUN SERPL-MCNC: 11 MG/DL
BUN SERPL-MCNC: 7 MG/DL
CALCIUM SERPL-MCNC: 9.7 MG/DL
CALCIUM SERPL-MCNC: 9.8 MG/DL
CANCER AG19-9 SERPL-ACNC: 306 U/ML
CANCER AG19-9 SERPL-ACNC: 678 U/ML
CEA SERPL-MCNC: 43.5 NG/ML
CHLORIDE SERPL-SCNC: 102 MMOL/L
CHLORIDE SERPL-SCNC: 102 MMOL/L
CO2 SERPL-SCNC: 26 MMOL/L
CO2 SERPL-SCNC: 27 MMOL/L
CREAT SERPL-MCNC: 0.6 MG/DL
CREAT SERPL-MCNC: 0.7 MG/DL
EGFR: 108 ML/MIN/1.73M2
EGFR: 113 ML/MIN/1.73M2
GLUCOSE BLDC GLUCOMTR-MCNC: 92 MG/DL — SIGNIFICANT CHANGE UP (ref 70–99)
GLUCOSE SERPL-MCNC: 199 MG/DL
GLUCOSE SERPL-MCNC: 310 MG/DL
INR PPP: 1.17
POTASSIUM SERPL-SCNC: 3.5 MMOL/L
POTASSIUM SERPL-SCNC: 3.8 MMOL/L
PROT SERPL-MCNC: 7.7 G/DL
PROT SERPL-MCNC: 7.8 G/DL
PT BLD: 13.9 SEC
SODIUM SERPL-SCNC: 138 MMOL/L
SODIUM SERPL-SCNC: 142 MMOL/L

## 2023-01-01 PROCEDURE — 71250 CT THORAX DX C-: CPT | Mod: 26

## 2023-01-01 PROCEDURE — 99215 OFFICE O/P EST HI 40 MIN: CPT | Mod: 25

## 2023-01-01 PROCEDURE — 99152 MOD SED SAME PHYS/QHP 5/>YRS: CPT

## 2023-01-01 PROCEDURE — 76937 US GUIDE VASCULAR ACCESS: CPT | Mod: 26

## 2023-01-01 PROCEDURE — 71250 CT THORAX DX C-: CPT

## 2023-01-01 PROCEDURE — 74177 CT ABD & PELVIS W/CONTRAST: CPT | Mod: 26

## 2023-01-01 PROCEDURE — 82962 GLUCOSE BLOOD TEST: CPT

## 2023-01-01 PROCEDURE — 99153 MOD SED SAME PHYS/QHP EA: CPT

## 2023-01-01 PROCEDURE — C1788: CPT

## 2023-01-01 PROCEDURE — XXXXX: CPT | Mod: 1L

## 2023-01-01 PROCEDURE — 36561 INSERT TUNNELED CV CATH: CPT

## 2023-01-01 PROCEDURE — 99213 OFFICE O/P EST LOW 20 MIN: CPT

## 2023-01-01 PROCEDURE — 76937 US GUIDE VASCULAR ACCESS: CPT

## 2023-01-01 PROCEDURE — 45330 DIAGNOSTIC SIGMOIDOSCOPY: CPT

## 2023-01-01 PROCEDURE — 99214 OFFICE O/P EST MOD 30 MIN: CPT | Mod: 25

## 2023-01-01 PROCEDURE — 77001 FLUOROGUIDE FOR VEIN DEVICE: CPT | Mod: 26

## 2023-01-01 PROCEDURE — 77001 FLUOROGUIDE FOR VEIN DEVICE: CPT

## 2023-01-01 PROCEDURE — 74183 MRI ABD W/O CNTR FLWD CNTR: CPT

## 2023-01-01 PROCEDURE — C1769: CPT

## 2023-01-01 PROCEDURE — 74183 MRI ABD W/O CNTR FLWD CNTR: CPT | Mod: 26

## 2023-01-01 RX ORDER — LIDOCAINE AND PRILOCAINE 25; 25 MG/G; MG/G
2.5-2.5 CREAM TOPICAL
Qty: 1 | Refills: 4 | Status: ACTIVE | COMMUNITY
Start: 2023-01-01 | End: 1900-01-01

## 2023-01-01 RX ORDER — ONDANSETRON 8 MG/1
8 TABLET, ORALLY DISINTEGRATING ORAL EVERY 8 HOURS
Qty: 60 | Refills: 8 | Status: ACTIVE | COMMUNITY
Start: 2023-01-01 | End: 1900-01-01

## 2023-01-01 RX ORDER — PEN NEEDLE, DIABETIC 32GX 5/32"
70 NEEDLE, DISPOSABLE MISCELLANEOUS
Qty: 100 | Refills: 0 | Status: DISCONTINUED | COMMUNITY
Start: 2021-02-23 | End: 2023-01-01

## 2023-01-01 RX ORDER — LORATADINE 10 MG
17 TABLET,DISINTEGRATING ORAL
Refills: 0 | Status: ACTIVE | COMMUNITY
Start: 2023-01-01

## 2023-01-01 RX ORDER — TRAMADOL HYDROCHLORIDE 50 MG/1
50 TABLET, COATED ORAL
Qty: 30 | Refills: 0 | Status: ACTIVE | COMMUNITY
Start: 2023-01-01 | End: 1900-01-01

## 2023-01-01 RX ORDER — LOPERAMIDE HYDROCHLORIDE 2 MG/1
2 CAPSULE ORAL
Qty: 80 | Refills: 6 | Status: ACTIVE | COMMUNITY
Start: 2023-01-01 | End: 1900-01-01

## 2023-01-01 RX ORDER — IBUPROFEN 600 MG/1
600 TABLET, FILM COATED ORAL 3 TIMES DAILY
Qty: 15 | Refills: 0 | Status: DISCONTINUED | COMMUNITY
Start: 2021-09-01 | End: 2023-01-01

## 2023-01-01 RX ORDER — BLOOD SUGAR DIAGNOSTIC
STRIP MISCELLANEOUS
Qty: 100 | Refills: 0 | Status: DISCONTINUED | COMMUNITY
Start: 2021-05-27 | End: 2023-01-01

## 2023-01-25 PROBLEM — R10.9 ABDOMINAL PAIN: Status: ACTIVE | Noted: 2023-01-01

## 2023-01-25 NOTE — ASSESSMENT
[FreeTextEntry1] : Dasia Prather is a 56 year old man who was diagnosed with a well differentiated rectal adenocarcinoma in late 2020. Clinical stage was T1N0M0 (stage I). The tumor was removed with polypectomy; a subsequent transanal full thickness biopsy on 2/25/21 by Dr Rowland showed no evidence of residual cancer. \par \par The patient has been undergoing surveillance. Sigmoidoscopy on 8/8/22 did not raise any concerns for local recurrence. However routine labs showed a significant rise in the CEA, up to 17. Labs also showed mildly elevated AST/ALT.   PET scan showed focal uptake in the proximal anal canal suspicious for local recurrence as well as a 3.8 cm mass in the liver that was FDG avid and concerning for malignancy, in the background of cirrhosis.  MRI of the liver did not reveal any additional lesions.\par \par Biopsy of the liver mass showed an adenocarcinoma that is CK7 and CK20 positive, with weak CDX2 expression. This IHC profile was felt to be most consistent with origin from the pancreas, biliary tract, stomach, or urothelium. Per the pathologist, the morphology and IHC profile is not typical of a rectal cancer metastasis.  On review in tumor board of imaging and pathology, the diagnosis of the liver lesion was felt to be most consistent with a primary intrahepatic cholangiocarcinoma.  [id number] (stage I).  baseline CA 19-9 = 216.  Unresectable;  s/p y-90 radioembolization 10/28/22.\par \par Plan:\par 1.  Stage 1 intrahepatic cholangiocarcinoma ([id number]), unresectable \par - [facility] (09/23/22): FRANCI, 0 TMB. KRAS/NRAS WT, ERBB2 amplification equivocal, MTAP loss, CDKN2A/B loss\par - s/p Y-90 radioembolization (10/28/22)\par - CA 19-9: 216 (09/29/22) --> 175 (11/08/22). pending today. \par - MRI Abdomen w/wo IV Contrast (12/27/22): Treated 4.7 cm centrally necrotic lesion in hepatic segment 8 with interval decrease in diffusion restriction, LI-RADS TR Equivocal. \par -  If disease is controlled/responding to Y-90, will reserve chemotherapy for time of progression.\par - Will need periodic imaging to evaluate for extra-hepatic disease (Chest CT Q6Months is reasonable, 3/2023)\par - Repeat CA 19-9 and CEA today \par \par 2.  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  Abdominal pain\par - Decreased in intensity and frequency; however, not responsive to Miralax, Gas-X, Omeprazole or Ibuprofen.  ? due to malignancy.\par - He has not been prescribed pain medications per [place] registry. \par - Will start Tramadol 50 mg PRN \par \par 4.  Rectal cancer\par --s/p colonoscopy (10/18/22). Per report, had fair bowel preparation with a small amount of semi-solid stool interfering with visualization in the ascending colon and cecum. 5-year follow-up. \par - Has a follow-up with Dr Rowland (2/2023)\par - cea pending today\par \par 5. Thrombocytopenia\par - Platelet 125 K today. Will continue to monitor, has been noted to have low platelets on occasion from time to time, likely related to cirrhosis\par \par labs with CBC, CMP, CA 19-9\par \par RTC in 2 months after MRI/CT

## 2023-01-25 NOTE — END OF VISIT
[] : Fellow [FreeTextEntry3] : \par I evaluated the patient with the Hematology/Oncology fellow, Dr Harden. The patient has two cancer diagnoses - [id number] intrahepatic cholangiocarcinoma (unresectable; treated with radioembolization 10/28/22) and [id number] rectal adenocarcinoma (managed with TAE in 12/2020).\par \par Presents w/ ongoing abdominal pain subdiaphragmatic unresponsive to OTC medications. \par \par Plan:\par 1.  Intrahepatic cholangio\par --s/p MRI in late 12/2022 for follow up. I discussed the MRI findings with Dr Pool.  Treated malignancy, no evidence of progression.  Plan is for a follow up MRI after a 2 month interval (early 3/2023).  May be a candidate for additional liver directed treatment if concerns for residual disease at that time\par --no evidence of extra-hepatic disease progression on MRI\par --obtain chest CT in 3/2023 for surveillance\par --CA 19-9 pending today\par \par 2.  abdominal pain - likely related to cholangiocarcinoma.  Has tried numerous OTC medications as outlined above, refractory.  Trial tramadol 50 mg QID PRN pain.  PDMP queried, no concerns.  Discussed risk of dependence, safe use of this medication\par \par 3.  Rectal ca - due for sigmoidoscopy w/ Dr Rowland next month.  CEA pending.  \par \par RTC in March after MRI, CT.

## 2023-01-25 NOTE — REASON FOR VISIT
[Follow-Up Visit] : a follow-up [Interpreters_IDNumber] : [id number] [Interpreters_FullName] : Maria Teresa [Interpreters_Relationshiptopatient] : Language Line [TWNoteComboBox1] : Bhutanese

## 2023-01-25 NOTE — REVIEW OF SYSTEMS
[Abdominal Pain] : abdominal pain [Constipation] : constipation [Negative] : Allergic/Immunologic [Difficulty Walking] : difficulty walking [Muscle Weakness] : muscle weakness [Fever] : no fever [Chills] : no chills [Night Sweats] : no night sweats [Fatigue] : no fatigue [Shortness Of Breath] : no shortness of breath [Cough] : no cough [SOB on Exertion] : no shortness of breath during exertion [Vomiting] : no vomiting [Diarrhea] : no diarrhea [Joint Pain] : no joint pain [Joint Stiffness] : no joint stiffness [Muscle Pain] : no muscle pain [Confused] : no confusion [Dizziness] : no dizziness [Fainting] : no fainting
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
## 2023-01-25 NOTE — PHYSICAL EXAM
[Restricted in physically strenuous activity but ambulatory and able to carry out work of a light or sedentary nature] : Status 1- Restricted in physically strenuous activity but ambulatory and able to carry out work of a light or sedentary nature, e.g., light house work, office work [Obese] : obese [Normal] : affect appropriate [de-identified] : soft. nondistended. mild pain without recoil on deep palpation. no masses. obesity limits evaluation for organomegaly [de-identified] : Slightly reduced 4/5 RLE, 5/5 LLE

## 2023-02-06 PROBLEM — C20 RECTAL ADENOCARCINOMA: Status: ACTIVE | Noted: 2021-01-08

## 2023-02-06 NOTE — HISTORY OF PRESENT ILLNESS
[FreeTextEntry1] : 56 y/o Japanese/English speaking M presents for for follow up of T1/T2 N0 stage I rectal CA, accompanied by daughter. \par \par Patient diagnosed on colonoscopy on 20, revealed 15 mm polyp in distal rectum, s/p resection and retrieval. \par \par Initial CT and MRI completed 21 revealed, solitary nodule within L lobe of prostate noted. No metastatic disease \par \par S/p transanal full thickness rectal lesion biopsy 21 revealing no residual disease. \par \par Seen 3/7/22, Exam notable for external hemorrhoid, no residual hemorrhoidal skin tags. Rigid proctosigmoidoscopy passed to 12 cm. Internal hemorrhoids appreciated no masses or lesions. H/o T1Nx rectal cancer, clinically VIRGILIO. Advised colonoscopy with primary GI and f/u in 6 mos. \par \par Surveillance MRI obtained on 22 revealing no residual disease. \par \par Most recently seen 22, No anal fissures appreciated. No perianal excoriation. External hemorrhoid with no residual skin tag appreciated. Sphincter tone was normal. Flexible sigmoidoscopy passed to 35 cm from the anal verge. Findings revealed, scar at proximal to the dentate line. No evidence of recurrent mass or lesions. Recommended repeat surveillance MRI. Labs drawn. \par \par Interval labs revealed elevated CEA levels (22) 17.0 ng/mL \par MRI performed [facility] 22 revealed solitary tumor in segments 4A and 8 of liver most suggestive of a metastasis. Intrahepatic cholangiocarcinoma though to be less likely. \par \par PET-CT performed [facility] 22\par Impression:\par 1. Focal uptake at the proximal anal canal, SUV max 6.6, most likely representing recurrent malignancy. Direct visualization and/or tissue typing may be of benefit for confirmation.\par 2. Hypermetabolic 3.8 cm hypodensity in hepatic segment 8, SUV max 7.8, most likely representing liver metastasis.\par 3. Mild nodular contour of the liver suggestive of cirrhosis. Correlate clinically.\par \par FELISHA Elizabeth contacted pt to discuss PET-CT results (22) . Findings were discussed with pt's daughter: \par All questions answered.  Recommend referral to MED ONC and consultation w/ SURG ONC. \par \par Liver biopsy revealed adenocarcinoma CK7 and CK20 (+) with weak CDX2 expression. The IHC profile felt more c/w pancreas and biliary tract, stomach or urothelium. Per pathologist, the morphology and IHC profile not typical of rectal ca metastasis.\par Presented at tumor board, consensus primary intrahepatic cholangiocarcinoma T2N0M0 stage I. \par \par IR Dr. Oli Pool \par Heme-Onc Dr. Tressa Blount \par \par CEA 22 ng/L (9/15/22) CA 19-19: 216 at diagnosis. \par \par S/p y90 radioembolization with Dr. Pool 10/28/22 \par \par 10/18/22 Colonoscopy:\par Impression: \par Perianal and digital examinations were normal. \par Many small and large mouthed diverticula found in the sigmoid colon. \par A small amount of semiliquid semi solid stool found in the ascending colon and in the cecum, interfering with visualization. \par MR pelvis performed [facility] 22\par Treated 4.7 cm centrally necrotic lesion in hepatic segment 8 with interval  in diffusion restriction. LI-RADS TR equivocal\par \par 22 MR Abdomen:\par IMPRESSION:\par Treated 4.7 cm centrally necrotic lesion in hepatic segment 8 with interval decrease in diffusion restriction, LI-RADS TR Equivocal.\par \par 23: CEA 43.5 ng/mL, CA 19-19 306. \par \par Continues to have intermittent abdominal pain with bloating, radiates to the upper epigastric region. Persistent not getting worse or more frequent. No relief with Omeprazole, Gas-X, or Ibuprofen. Was recently prescribed Tramadol by Dr. Blount for severe pain, has not taken any yet.\par \par BH: daily, soft and formed, No difficulty, denies straining, denies bleeding  \par Daily Miralax/prune juice, will become constipated if he does not take stool softeners\par gained weight, no WL\par denies fiber supplements\par Adequate fruits and vegetables\par \par Patient reports completing radiation therapy in mid January. Scored 7 on NCCN Distress screening thermometer. Will refer to SW. Patient attributes some of his stress to having Mother moved in from [place] a couple months ago. \par

## 2023-02-06 NOTE — ASSESSMENT
[FreeTextEntry1] : No evidence of recurrent rectal cancer.  Recommend continued surveillance.  Advised follow-up with medical oncology for imaging and further treatment of his concurrent secondary liver cancer.\par \par A  was utilized throughout the procedure.  All questions answered.

## 2023-02-06 NOTE — PHYSICAL EXAM
[No HSM] : no hepatosplenomegaly [Normal] : was normal [None] : there was no rectal mass  [No Rash or Lesion] : No rash or lesion [Alert] : alert [Calm] : calm [Abdomen Masses] : No abdominal masses [Abdomen Tenderness] : ~T No ~M abdominal tenderness [Excoriation] : no perianal excoriation [Skin Tags] : there were no residual hemorrhoidal skin tags seen [FreeTextEntry1] : The risks , benefits and alternatives of the procedure were reviewed with the patient. The patient consents to the planned procedure.\par \par The flexible sigmoidoscope was passed through the anus into the rectum. The scope was passed to  35   cm from the anal verge.\par \par The findings revealed:\par \par Scar at proximal to the dentate line.  No evidence of recurrent mass or lesion.

## 2023-03-24 PROBLEM — D69.6 THROMBOCYTOPENIA: Status: ACTIVE | Noted: 2022-01-01

## 2023-03-24 PROBLEM — G89.3 CANCER ASSOCIATED PAIN: Status: ACTIVE | Noted: 2023-01-01

## 2023-03-24 PROBLEM — R11.2 CHEMOTHERAPY-INDUCED NAUSEA AND VOMITING: Status: ACTIVE | Noted: 2023-01-01

## 2023-03-24 PROBLEM — C78.00 LUNG METASTASES: Status: ACTIVE | Noted: 2023-01-01

## 2023-03-24 NOTE — ASSESSMENT
[FreeTextEntry1] : Dasia Prather is a 57 year old man who was diagnosed with a well differentiated rectal adenocarcinoma in late 2020. Clinical stage was T1N0M0 (stage I). The tumor was removed with polypectomy; a subsequent transanal full thickness biopsy on 2/25/21 by Dr Rowland showed no evidence of residual cancer. \par \par The patient has been undergoing surveillance. Sigmoidoscopy on 8/8/22 did not raise any concerns for local recurrence. However routine labs showed a significant rise in the CEA, up to 17. Labs also showed mildly elevated AST/ALT.   PET scan showed focal uptake in the proximal anal canal suspicious for local recurrence as well as a 3.8 cm mass in the liver that was FDG avid and concerning for malignancy, in the background of cirrhosis.  MRI of the liver did not reveal any additional lesions.\par \par Biopsy of the liver mass showed an adenocarcinoma that was CK7 and CK20 positive, with weak CDX2 expression. This IHC profile was felt to be most consistent with origin from the pancreas, biliary tract, stomach, or urothelium. Per the pathologist, the morphology and IHC profile was not typical of a rectal cancer metastasis.  On review in tumor board of imaging and pathology, the diagnosis of the liver lesion was felt to be most consistent with a primary intrahepatic cholangiocarcinoma.  [id number] (stage I).  baseline CA 19-9 = 216.  Unresectable;  s/p y-90 radioembolization 10/28/22. However,  MRI AP & CT chest on 3/17/23 showing multiple new liver lesions, increased upper abdominal adenopathy, multiple (greater than 10) new pulmonary nodules c/w POD.  \par \par Plan:\par 1. intrahepatic cholangiocarcinoma, now metastatic cholangiocarcinoma. \par -- recent MRI  AP and CT chest on 3/17/23 showing multiple new liver lesions, increased upper abdominal adenopathy, multiple (greater than 10) new varied-sized pulmonary nodules  c/w POD.  Discussed results with pt and family using  from language line.  explained implications of this;  stage IV disease\par --Foundation one testing was performed on the liver biopsy in 2022.  No targetable mutations for first line therapy.\par --Offered Cisplatin/Gemcitabine/Durvalumab \par --He was provided with patient educational materials. Risks, benefits and alternatives were discussed. He and family member were also provided with the opportunity to ask questions. He signed consent for Cisplatin/Gemcitabine/Durvalumab\par --will repeat CT AP for restaging prior to chemotherapy to fully evaluate the pelvis\par --will request a port placement to IR\par --follow tumor marker CA 19-9\par --CT c/a/p repeat after 3 months of treatment, unless signs/symptoms concerning for disease progression in the interim\par --no plans for further Y-90 treatment in the setting of metastatic disease.\par \par 2. abdominal pain \par --likely related to cholangiocarcinoma. Has tried numerous OTC medications as outlined above, refractory. Tried tramadol 50 mg QID PRN pain with help. \par --renewed tramadol today\par --referral to Dr. Pereira for further pain/symptoms management \par \par 3. rectal cancer/constipation\par --s/p colonoscopy 10/18/22.  Per report, may have had poor prep. but no lesions or polyps seen.\par --s/p sigmoidoscopy w/ Dr Rowland on 2/4/23, showing scar at proximal to the dentate line. No evidence of recurrent mass or lesion. \par --scheduled for a colonoscopy next week to address refractory constipation with Dr Pipo Nunez.  Asked daughter to request to have copy of report sent to us \par --CEA 17(8/8/22), 22.8(9/15/22), 43.5(1/25/23)\par  \par 4. T2DM\par --Glucose 310 today\par --due to insurance issues, he is unable to take one of DM meds. \par --f/u with PCP\par \par 5.  thrombocytopenia \par --chronic, ? related to liver disease\par -within parameters for treatment.\par \par C1 Cisplatin/Gemcitabine/Durvalumab on 4/4\par RTC with C1D8 on 4/12\par labs with CBC, CMP, Mg
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
## 2023-03-24 NOTE — PHYSICAL EXAM
[Restricted in physically strenuous activity but ambulatory and able to carry out work of a light or sedentary nature] : Status 1- Restricted in physically strenuous activity but ambulatory and able to carry out work of a light or sedentary nature, e.g., light house work, office work [Obese] : obese [Normal] : affect appropriate [de-identified] : supple [de-identified] : normal RR, breathing pattern [de-identified] : normal HR [de-identified] :  nondistended.

## 2023-03-24 NOTE — REASON FOR VISIT
[Follow-Up Visit] : a follow-up [Interpreters_IDNumber] : [id number] [Interpreters_FullName] : Karis [Interpreters_Relationshiptopatient] : Language Line [TWNoteComboBox1] : [place]

## 2023-03-24 NOTE — REVIEW OF SYSTEMS
[Abdominal Pain] : abdominal pain [Constipation] : constipation [Negative] : Allergic/Immunologic [Shortness Of Breath] : no shortness of breath [Cough] : no cough [SOB on Exertion] : no shortness of breath during exertion [Vomiting] : no vomiting [Diarrhea] : no diarrhea

## 2023-04-03 PROBLEM — R68.89 ABNORMAL DIGITAL RECTAL EXAM: Status: ACTIVE | Noted: 2021-02-10

## 2023-04-03 PROBLEM — C22.1 PRIMARY CHOLANGIOCARCINOMA OF INTRAHEPATIC BILE DUCT: Status: ACTIVE | Noted: 2022-01-01

## 2023-04-03 NOTE — HISTORY OF PRESENT ILLNESS
[None] : None [FreeTextEntry1] : This is a 55 year old male who presents today as referral from Dr. Rowland after CT finding of solitary nodule within left lobe of the prostate gland.  He is in the process of being treated for rectal adenocarcinoma. \par Recent PSA 1.03 done on 2/4/21\par \par He reports Nocturia x 4-5 for the past 6 months, which he state prior was 0\par No hematuria, no dysuria\par Occasional lower back pain, can be severe at time, resolves without intervention.\par \par He is currently scheduled to undergo transanal excision of the polypectomy on 2/25/21\par \par Social Hx: nonsmoker, rare ETOH use, works in a restaurant\par Family Hx: no CaP, unusure of any other cancer within family\par \par 8/02/21 Here for f/u. MRI showed PI-RADS 5 lesion right anterior horn, 17mm in greatest dimension. Here to discuss MRI fusion biopsy. Recent MRI pelvis for rectal ca shows no evidence of recurrence. \par \par 3/7/22 Here for f/u. Underwent MRI fusion biopsy 8/2021 which was negative. Recent MRI pelvis last month negative for adenopathy but prostate not evaluated. PSA has been around 1. No urinary complaints. \par \par 9/7/22 Here for follow. No urological complaints. Last PSA 0.65 - 3/2022. No recent prostate imaging. He is currently being evaluated for possible colon cancer recurrence.  He had a recent PET scan suspicious for liver mets.\par \par 4/3/23 Here for f/u. No urinary complaints. Scheduled to start chemo for metastatic cholangiocarcinoma this week.

## 2023-04-03 NOTE — ASSESSMENT
[FreeTextEntry1] : 57 year old male with nodule on prostate found after work up for colon cancer. \par MRI 4/2021 17mm PI-RADS 5 lesion, high suspicion for cancer\par MRI fusion biopsy 8/2021 negative \par Was recommend repeat MRI prostate 3/2022 which was not done. He is now scheduled to start treatment for metastatic cholangiocarcinoma \par We will hold off on further prostate imaging/workup for now. \par Follow up 1 year or sooner PRN \par

## 2023-05-12 NOTE — ED ADULT TRIAGE NOTE - NS ED TRIAGE AVPU SCALE
2 weeks Alert-The patient is alert, awake and responds to voice. The patient is oriented to time, place, and person. The triage nurse is able to obtain subjective information.

## 2023-08-07 NOTE — HISTORY OF PRESENT ILLNESS
[FreeTextEntry1] : 56 y/o Mongolian/English speaking M presents for f/u sigmoidoscopy pT1/T2N0 stage I rectal CA   Patient diagnosed on colonoscopy on 20, revealed 15 mm polyp in distal rectum, s/p resection and retrieval.  Initial CT and MRI completed 21 revealed, solitary nodule within L lobe of prostate noted. No metastatic disease  S/p transanal full thickness rectal lesion biopsy 21 revealing no residual disease.  Seen 3/7/22, Exam notable for external hemorrhoid, no residual hemorrhoidal skin tags. Rigid proctosigmoidoscopy passed to 12 cm. Internal hemorrhoids appreciated no masses or lesions. H/o T1Nx rectal cancer, clinically VIRGILIO. Advised colonoscopy with primary GI and f/u in 6 mos.  Surveillance MRI obtained on 22 revealing no residual disease.  Most recently seen 22, No anal fissures appreciated. No perianal excoriation. External hemorrhoid with no residual skin tag appreciated. Sphincter tone was normal. Flexible sigmoidoscopy passed to 35 cm from the anal verge. Findings revealed, scar at proximal to the dentate line. No evidence of recurrent mass or lesions. Recommended repeat surveillance MRI. Labs drawn.  Interval labs revealed elevated CEA levels (22) 17.0 ng/mL  MRI performed [facility] 22 revealed solitary tumor in segments 4A and 8 of liver most suggestive of a metastasis. Intrahepatic cholangiocarcinoma though to be less likely.  PET-CT performed [facility] 22 Impression: 1. Focal uptake at the proximal anal canal, SUV max 6.6, most likely representing recurrent malignancy. Direct visualization and/or tissue typing may be of benefit for confirmation. 2. Hypermetabolic 3.8 cm hypodensity in hepatic segment 8, SUV max 7.8, most likely representing liver metastasis. 3. Mild nodular contour of the liver suggestive of cirrhosis. Correlate clinically.  FELISHA Elizabeth contacted pt to discuss PET-CT results (22) . Findings were discussed with pt's daughter: All questions answered. Recommend referral to MED ONC and consultation w/ SURG ONC.  Liver biopsy revealed adenocarcinoma CK7 and CK20 (+) with weak CDX2 expression. The IHC profile felt more c/w pancreas and biliary tract, stomach or urothelium. Per pathologist, the morphology and IHC profile not typical of rectal ca metastasis.  Presented at tumor board, consensus primary intrahepatic cholangiocarcinoma T2N0M0 stage I.  IR Dr. Oli Pool Heme-Onc Dr. Tressa Blount CEA 22 ng/L (9/15/22) CA 19-19: 216 at diagnosis.  S/p y90 radioembolization with Dr. Pool 10/28/22  10/18/22 Colonoscopy: Impression: Perianal and digital examinations were normal. Many small and large mouthed diverticula found in the sigmoid colon. A small amount of semiliquid semi solid stool found in the ascending colon and in the cecum, interfering with visualization.  MR pelvis performed [facility] 22 Treated 4.7 cm centrally necrotic lesion in hepatic segment 8 with interval  in diffusion restriction. LI-RADS TR equivocal  22 MR Abdomen: IMPRESSION: Treated 4.7 cm centrally necrotic lesion in hepatic segment 8 with interval decrease in diffusion restriction, LI-RADS TR Equivocal.  23: CEA 43.5 ng/mL, CA 19-19 306.  Most recent office visit 23, Exam notable for external hemorrhoid, there were no residual hemorrhoidal skin tags appreciated. Sphincter tone was normal. Flexible sigmoidoscopy passed to 35 cm from the anal verge. Findings revealed, scar at proximal to dentate line. No evidence of recurrent mass or lesion. No evidence of recurrent disease recommended continued surveillance.   Off note, patient completed radiation therapy mid-January, scored 7 on NCCN distress screening thermometer. Pt attributes stress mostly having mother moved in from [place] a few months ago.   Interim, patient seen by Med Onc Dr. Blount 3/24/23, Pt offered Cisplatin/Gemcitabine/Durvalumab, port placement to IR, pt agreed. Port cath placed 3/30/23. Pt's daughter later contacted Dr. Blount's office and cancelled appointments, explained pt would transfer his care to hospital in [place].   MR of the abdomen performed [facility] on 3/17/23 Impression:  Suspect multiple new subcentimeter lesions mostly right lobe, some in the left lobe. Dominant segment 4/8 lesion with equivocal residual disease. Increased upper abdominal adenopathy. New lung nodules.  CT A/P performed [facility] on 3/28/23 Impression:  1.  Partially visualized right hilar mass; cannot exclude metastasis. Recommend chest CT with intravenous contrast. 2.  Main hepatic lesion, compatible with history of known cholangiocarcinoma. Multiple hepatic satellite lesions noted. 3.  Mild paraceliac and gastrohepatic lymphadenopathy. 4.  Nonfilling of right intrahepatic branch of portal vein; suspicious for tumor thrombus

## 2023-12-04 NOTE — ASSESSMENT
[FreeTextEntry1] : Recommend continued surveillance.  Follow-up 4 months for repeat examination.  He will continue with routine lab work with his primary care doctor including CBC liver function tests and CEA.\par \par Advised interval imaging in 2 months.  MRI ordered.
(E4) spontaneous

## 2024-12-03 NOTE — ED ADULT NURSE NOTE - CHIEF COMPLAINT
The patient is a 56y Male complaining of abdominal pain.

## 2025-02-20 NOTE — ED PROVIDER NOTE - NS ED MD DISPO DISCHARGE CCDA
Detail Level: Detailed Depth Of Biopsy: dermis Was A Bandage Applied: Yes Size Of Lesion In Cm: 2 X Size Of Lesion In Cm: 0 Biopsy Type: H and E Biopsy Method: Personna blade Anesthesia Type: 1% lidocaine without epinephrine Anesthesia Volume In Cc: 1 Hemostasis: Drysol Wound Care: Petrolatum Dressing: bandage Destruction After The Procedure: No Type Of Destruction Used: Curettage Curettage Text: The wound bed was treated with curettage after the biopsy was performed. Cryotherapy Text: The wound bed was treated with cryotherapy after the biopsy was performed. Electrodesiccation Text: The wound bed was treated with electrodesiccation after the biopsy was performed. Electrodesiccation And Curettage Text: The wound bed was treated with electrodesiccation and curettage after the biopsy was performed. Silver Nitrate Text: The wound bed was treated with silver nitrate after the biopsy was performed. Lab: -[id number] Lab Facility: 3 Medical Necessity Information: It is in your best interest to select a reason for this procedure from the list below. All of these items fulfill various CMS LCD requirements except the new and changing color options. Consent: Written consent was obtained and risks were reviewed including but not limited to scarring, infection, bleeding, scabbing, incomplete removal, nerve damage and allergy to anesthesia. Post-Care Instructions: I reviewed with the patient in detail post-care instructions. Patient is to keep the biopsy site dry overnight, and then apply aquaphor twice daily until healed. Patient may apply hydrogen peroxide soaks to remove any crusting. Notification Instructions: Patient will be notified of biopsy results. However, patient instructed to call the office if not contacted within 2 weeks. Billing Type: Third-Party Bill Information: Selecting Yes will display possible errors in your note based on the variables you have selected. This validation is only offered as a suggestion for you. PLEASE NOTE THAT THE VALIDATION TEXT WILL BE REMOVED WHEN YOU FINALIZE YOUR NOTE. IF YOU WANT TO FAX A PRELIMINARY NOTE YOU WILL NEED TO TOGGLE THIS TO 'NO' IF YOU DO NOT WANT IT IN YOUR FAXED NOTE. Patient/Caregiver provided printed discharge information.

## 2025-04-23 NOTE — ASU PREOP CHECKLIST - HEIGHT IN FEET
Take lisinopril as prescribed.  Hold the amlodipine and hydrochlorothiazide as discussed in the ER.  Continue to monitor blood pressures and follow-up with primary care physician in 3 to 5 days or may return to the ER for any new or worsening symptoms.  
5